# Patient Record
Sex: FEMALE | Race: WHITE | HISPANIC OR LATINO | Employment: UNEMPLOYED | URBAN - METROPOLITAN AREA
[De-identification: names, ages, dates, MRNs, and addresses within clinical notes are randomized per-mention and may not be internally consistent; named-entity substitution may affect disease eponyms.]

---

## 2021-09-24 PROCEDURE — C9803 HOPD COVID-19 SPEC COLLECT: HCPCS

## 2021-09-24 PROCEDURE — 99285 EMERGENCY DEPT VISIT HI MDM: CPT | Mod: 25

## 2021-09-24 PROCEDURE — 96376 TX/PRO/DX INJ SAME DRUG ADON: CPT

## 2021-09-24 PROCEDURE — 96374 THER/PROPH/DIAG INJ IV PUSH: CPT

## 2021-09-24 PROCEDURE — 96375 TX/PRO/DX INJ NEW DRUG ADDON: CPT

## 2021-09-24 PROCEDURE — 96361 HYDRATE IV INFUSION ADD-ON: CPT

## 2021-09-25 ENCOUNTER — APPOINTMENT (OUTPATIENT)
Dept: ULTRASOUND IMAGING | Facility: CLINIC | Age: 26
End: 2021-09-25
Attending: EMERGENCY MEDICINE

## 2021-09-25 ENCOUNTER — HOSPITAL ENCOUNTER (OUTPATIENT)
Facility: CLINIC | Age: 26
Discharge: HOME | End: 2021-09-26
Attending: EMERGENCY MEDICINE | Admitting: INTERNAL MEDICINE

## 2021-09-25 DIAGNOSIS — K85.90 ACUTE PANCREATITIS, UNSPECIFIED COMPLICATION STATUS, UNSPECIFIED PANCREATITIS TYPE: ICD-10-CM

## 2021-09-25 LAB
ALBUMIN SERPL-MCNC: 3.8 G/DL (ref 3.4–5)
ALP SERPL-CCNC: 84 U/L (ref 40–150)
ALT SERPL W P-5'-P-CCNC: 70 U/L (ref 0–50)
ANION GAP SERPL CALCULATED.3IONS-SCNC: 6 MMOL/L (ref 3–14)
AST SERPL W P-5'-P-CCNC: 28 U/L (ref 0–45)
B-HCG SERPL-ACNC: <1 IU/L (ref 0–5)
BASOPHILS # BLD AUTO: 0 10E3/UL (ref 0–0.2)
BASOPHILS NFR BLD AUTO: 0 %
BILIRUB SERPL-MCNC: 0.6 MG/DL (ref 0.2–1.3)
BUN SERPL-MCNC: 8 MG/DL (ref 7–30)
CALCIUM SERPL-MCNC: 10.4 MG/DL (ref 8.5–10.1)
CHLORIDE BLD-SCNC: 101 MMOL/L (ref 94–109)
CHOLEST SERPL-MCNC: 169 MG/DL
CO2 SERPL-SCNC: 29 MMOL/L (ref 20–32)
CREAT SERPL-MCNC: 0.63 MG/DL (ref 0.52–1.04)
EOSINOPHIL # BLD AUTO: 0.2 10E3/UL (ref 0–0.7)
EOSINOPHIL NFR BLD AUTO: 2 %
ERYTHROCYTE [DISTWIDTH] IN BLOOD BY AUTOMATED COUNT: 12.3 % (ref 10–15)
ETHANOL SERPL-MCNC: <0.01 G/DL
GFR SERPL CREATININE-BSD FRML MDRD: >90 ML/MIN/1.73M2
GLUCOSE BLD-MCNC: 107 MG/DL (ref 70–99)
HCT VFR BLD AUTO: 41.9 % (ref 35–47)
HDLC SERPL-MCNC: 40 MG/DL
HGB BLD-MCNC: 13.8 G/DL (ref 11.7–15.7)
HOLD SPECIMEN: NORMAL
IMM GRANULOCYTES # BLD: 0 10E3/UL
IMM GRANULOCYTES NFR BLD: 0 %
LDLC SERPL CALC-MCNC: 111 MG/DL
LIPASE SERPL-CCNC: 1552 U/L (ref 73–393)
LYMPHOCYTES # BLD AUTO: 2.7 10E3/UL (ref 0.8–5.3)
LYMPHOCYTES NFR BLD AUTO: 28 %
MAGNESIUM SERPL-MCNC: 1.9 MG/DL (ref 1.6–2.3)
MCH RBC QN AUTO: 31.5 PG (ref 26.5–33)
MCHC RBC AUTO-ENTMCNC: 32.9 G/DL (ref 31.5–36.5)
MCV RBC AUTO: 96 FL (ref 78–100)
MONOCYTES # BLD AUTO: 0.6 10E3/UL (ref 0–1.3)
MONOCYTES NFR BLD AUTO: 6 %
NEUTROPHILS # BLD AUTO: 6.2 10E3/UL (ref 1.6–8.3)
NEUTROPHILS NFR BLD AUTO: 64 %
NONHDLC SERPL-MCNC: 129 MG/DL
NRBC # BLD AUTO: 0 10E3/UL
NRBC BLD AUTO-RTO: 0 /100
PLATELET # BLD AUTO: 340 10E3/UL (ref 150–450)
POTASSIUM BLD-SCNC: 3.8 MMOL/L (ref 3.4–5.3)
POTASSIUM BLD-SCNC: 3.8 MMOL/L (ref 3.4–5.3)
PROT SERPL-MCNC: 8.2 G/DL (ref 6.8–8.8)
RBC # BLD AUTO: 4.38 10E6/UL (ref 3.8–5.2)
SARS-COV-2 RNA RESP QL NAA+PROBE: NEGATIVE
SODIUM SERPL-SCNC: 136 MMOL/L (ref 133–144)
TRIGL SERPL-MCNC: 89 MG/DL
WBC # BLD AUTO: 9.7 10E3/UL (ref 4–11)

## 2021-09-25 PROCEDURE — 258N000003 HC RX IP 258 OP 636: Performed by: EMERGENCY MEDICINE

## 2021-09-25 PROCEDURE — 96375 TX/PRO/DX INJ NEW DRUG ADDON: CPT

## 2021-09-25 PROCEDURE — 82784 ASSAY IGA/IGD/IGG/IGM EACH: CPT | Performed by: INTERNAL MEDICINE

## 2021-09-25 PROCEDURE — 76705 ECHO EXAM OF ABDOMEN: CPT

## 2021-09-25 PROCEDURE — 84132 ASSAY OF SERUM POTASSIUM: CPT | Performed by: INTERNAL MEDICINE

## 2021-09-25 PROCEDURE — 87635 SARS-COV-2 COVID-19 AMP PRB: CPT | Performed by: EMERGENCY MEDICINE

## 2021-09-25 PROCEDURE — 250N000011 HC RX IP 250 OP 636: Performed by: INTERNAL MEDICINE

## 2021-09-25 PROCEDURE — 83690 ASSAY OF LIPASE: CPT | Performed by: EMERGENCY MEDICINE

## 2021-09-25 PROCEDURE — 82077 ASSAY SPEC XCP UR&BREATH IA: CPT | Performed by: EMERGENCY MEDICINE

## 2021-09-25 PROCEDURE — 36415 COLL VENOUS BLD VENIPUNCTURE: CPT | Performed by: INTERNAL MEDICINE

## 2021-09-25 PROCEDURE — 80061 LIPID PANEL: CPT | Performed by: INTERNAL MEDICINE

## 2021-09-25 PROCEDURE — C9113 INJ PANTOPRAZOLE SODIUM, VIA: HCPCS | Performed by: INTERNAL MEDICINE

## 2021-09-25 PROCEDURE — 96374 THER/PROPH/DIAG INJ IV PUSH: CPT

## 2021-09-25 PROCEDURE — 83735 ASSAY OF MAGNESIUM: CPT | Performed by: INTERNAL MEDICINE

## 2021-09-25 PROCEDURE — 80053 COMPREHEN METABOLIC PANEL: CPT | Performed by: EMERGENCY MEDICINE

## 2021-09-25 PROCEDURE — 84702 CHORIONIC GONADOTROPIN TEST: CPT | Performed by: EMERGENCY MEDICINE

## 2021-09-25 PROCEDURE — 96376 TX/PRO/DX INJ SAME DRUG ADON: CPT

## 2021-09-25 PROCEDURE — 36415 COLL VENOUS BLD VENIPUNCTURE: CPT | Performed by: EMERGENCY MEDICINE

## 2021-09-25 PROCEDURE — 258N000003 HC RX IP 258 OP 636: Performed by: INTERNAL MEDICINE

## 2021-09-25 PROCEDURE — 85025 COMPLETE CBC W/AUTO DIFF WBC: CPT | Performed by: EMERGENCY MEDICINE

## 2021-09-25 PROCEDURE — 99207 PR APP CREDIT; MD BILLING SHARED VISIT: CPT | Performed by: INTERNAL MEDICINE

## 2021-09-25 PROCEDURE — 120N000006 HC R&B PEDS

## 2021-09-25 PROCEDURE — 250N000011 HC RX IP 250 OP 636: Performed by: EMERGENCY MEDICINE

## 2021-09-25 PROCEDURE — 99223 1ST HOSP IP/OBS HIGH 75: CPT | Mod: AI | Performed by: INTERNAL MEDICINE

## 2021-09-25 RX ORDER — ONDANSETRON 2 MG/ML
4 INJECTION INTRAMUSCULAR; INTRAVENOUS ONCE
Status: COMPLETED | OUTPATIENT
Start: 2021-09-25 | End: 2021-09-25

## 2021-09-25 RX ORDER — NALOXONE HYDROCHLORIDE 0.4 MG/ML
0.2 INJECTION, SOLUTION INTRAMUSCULAR; INTRAVENOUS; SUBCUTANEOUS
Status: DISCONTINUED | OUTPATIENT
Start: 2021-09-25 | End: 2021-09-26 | Stop reason: HOSPADM

## 2021-09-25 RX ORDER — SODIUM CHLORIDE, SODIUM LACTATE, POTASSIUM CHLORIDE, CALCIUM CHLORIDE 600; 310; 30; 20 MG/100ML; MG/100ML; MG/100ML; MG/100ML
INJECTION, SOLUTION INTRAVENOUS CONTINUOUS
Status: DISCONTINUED | OUTPATIENT
Start: 2021-09-25 | End: 2021-09-26 | Stop reason: HOSPADM

## 2021-09-25 RX ORDER — HYDROMORPHONE HYDROCHLORIDE 1 MG/ML
0.5 INJECTION, SOLUTION INTRAMUSCULAR; INTRAVENOUS; SUBCUTANEOUS ONCE
Status: COMPLETED | OUTPATIENT
Start: 2021-09-25 | End: 2021-09-25

## 2021-09-25 RX ORDER — ACETAMINOPHEN 325 MG/1
650 TABLET ORAL EVERY 4 HOURS PRN
Status: DISCONTINUED | OUTPATIENT
Start: 2021-09-25 | End: 2021-09-26 | Stop reason: HOSPADM

## 2021-09-25 RX ORDER — ONDANSETRON 4 MG/1
4 TABLET, ORALLY DISINTEGRATING ORAL EVERY 6 HOURS PRN
Status: DISCONTINUED | OUTPATIENT
Start: 2021-09-25 | End: 2021-09-26 | Stop reason: HOSPADM

## 2021-09-25 RX ORDER — NALOXONE HYDROCHLORIDE 0.4 MG/ML
0.4 INJECTION, SOLUTION INTRAMUSCULAR; INTRAVENOUS; SUBCUTANEOUS
Status: DISCONTINUED | OUTPATIENT
Start: 2021-09-25 | End: 2021-09-26 | Stop reason: HOSPADM

## 2021-09-25 RX ORDER — LIDOCAINE 40 MG/G
CREAM TOPICAL
Status: DISCONTINUED | OUTPATIENT
Start: 2021-09-25 | End: 2021-09-26 | Stop reason: HOSPADM

## 2021-09-25 RX ORDER — ONDANSETRON 2 MG/ML
4 INJECTION INTRAMUSCULAR; INTRAVENOUS EVERY 6 HOURS PRN
Status: DISCONTINUED | OUTPATIENT
Start: 2021-09-25 | End: 2021-09-26 | Stop reason: HOSPADM

## 2021-09-25 RX ORDER — HYDROMORPHONE HCL IN WATER/PF 6 MG/30 ML
0.2 PATIENT CONTROLLED ANALGESIA SYRINGE INTRAVENOUS
Status: DISCONTINUED | OUTPATIENT
Start: 2021-09-25 | End: 2021-09-25

## 2021-09-25 RX ORDER — KETOROLAC TROMETHAMINE 15 MG/ML
15 INJECTION, SOLUTION INTRAMUSCULAR; INTRAVENOUS ONCE
Status: COMPLETED | OUTPATIENT
Start: 2021-09-25 | End: 2021-09-25

## 2021-09-25 RX ORDER — SODIUM CHLORIDE 9 MG/ML
INJECTION, SOLUTION INTRAVENOUS CONTINUOUS
Status: DISCONTINUED | OUTPATIENT
Start: 2021-09-25 | End: 2021-09-25

## 2021-09-25 RX ORDER — HYDROMORPHONE HCL IN WATER/PF 6 MG/30 ML
.2-.4 PATIENT CONTROLLED ANALGESIA SYRINGE INTRAVENOUS
Status: DISCONTINUED | OUTPATIENT
Start: 2021-09-25 | End: 2021-09-26 | Stop reason: HOSPADM

## 2021-09-25 RX ORDER — OXYCODONE HYDROCHLORIDE 5 MG/1
5 TABLET ORAL EVERY 4 HOURS PRN
Status: DISCONTINUED | OUTPATIENT
Start: 2021-09-25 | End: 2021-09-26 | Stop reason: HOSPADM

## 2021-09-25 RX ADMIN — ONDANSETRON 4 MG: 2 INJECTION INTRAMUSCULAR; INTRAVENOUS at 01:37

## 2021-09-25 RX ADMIN — HYDROMORPHONE HYDROCHLORIDE 0.5 MG: 1 INJECTION, SOLUTION INTRAMUSCULAR; INTRAVENOUS; SUBCUTANEOUS at 01:37

## 2021-09-25 RX ADMIN — ONDANSETRON 4 MG: 2 INJECTION INTRAMUSCULAR; INTRAVENOUS at 03:38

## 2021-09-25 RX ADMIN — SODIUM CHLORIDE, POTASSIUM CHLORIDE, SODIUM LACTATE AND CALCIUM CHLORIDE: 600; 310; 30; 20 INJECTION, SOLUTION INTRAVENOUS at 16:47

## 2021-09-25 RX ADMIN — PANTOPRAZOLE SODIUM 40 MG: 40 INJECTION, POWDER, FOR SOLUTION INTRAVENOUS at 08:31

## 2021-09-25 RX ADMIN — SODIUM CHLORIDE, PRESERVATIVE FREE: 5 INJECTION INTRAVENOUS at 06:09

## 2021-09-25 RX ADMIN — SODIUM CHLORIDE, POTASSIUM CHLORIDE, SODIUM LACTATE AND CALCIUM CHLORIDE: 600; 310; 30; 20 INJECTION, SOLUTION INTRAVENOUS at 08:30

## 2021-09-25 RX ADMIN — HYDROMORPHONE HYDROCHLORIDE 0.2 MG: 0.2 INJECTION, SOLUTION INTRAMUSCULAR; INTRAVENOUS; SUBCUTANEOUS at 08:25

## 2021-09-25 RX ADMIN — SODIUM CHLORIDE 1000 ML: 9 INJECTION, SOLUTION INTRAVENOUS at 01:36

## 2021-09-25 RX ADMIN — KETOROLAC TROMETHAMINE 15 MG: 15 INJECTION, SOLUTION INTRAMUSCULAR; INTRAVENOUS at 01:37

## 2021-09-25 RX ADMIN — HYDROMORPHONE HYDROCHLORIDE 0.5 MG: 1 INJECTION, SOLUTION INTRAMUSCULAR; INTRAVENOUS; SUBCUTANEOUS at 03:39

## 2021-09-25 ASSESSMENT — ENCOUNTER SYMPTOMS
RHINORRHEA: 0
NAUSEA: 1
CHILLS: 1
ABDOMINAL PAIN: 1
VOMITING: 1
COUGH: 0
FEVER: 0

## 2021-09-25 ASSESSMENT — MIFFLIN-ST. JEOR: SCORE: 1644.99

## 2021-09-25 NOTE — CONSULTS
[facility] Consultation Note     Patient Name: Chelsie Pierce      YOB: 1995 (Age: 26 year old)   Medical Record #: [id number]   Primary Physician: No Ref-Primary, Physician   Admit Date/Time: 9/25/2021 12:45 AM     I was asked to see this patient by Dr. Vasques for evaluation of pancreatitis.     Impression & Plan     26 year old female with episode of pancreatitis a year ago presenting with acute pancreatitis.    1. Pancreatitis.  Her initial episode of pancreatitis was thought to be secondary to alcohol.  However she has not had any alcohol in 9 months.  Her liver tests do not support gallstones as a cause of pancreatitis.  She is on no medications.  Her calcium is slightly high, triglycerides normal, although this should be interpreted with caution in the setting of acute pancreatitis.  No family history of pancreatitis.    Calcium, triglycerides after pancreatitis resolved    IgG4 to look for autoimmune pancreatitis as cause    Recommend low fat diet, avoidance of alcohol at discharge    Given she feels better, okay to advance diet slowly    If above evaluation is negative, she should have an endoscopic ultrasound in 6 weeks to evaluate for cause  2. Elevated liver tests.  The pattern of elevated liver tests and steatosis on ultrasound, in the setting of elevated BMI are consistent with nonalcoholic fatty liver disease.    Plan was communicated with primary care team. Thank you for allowing us to participate in the care of Chelsie Pierce. Please call with any questions or pertinent change in clinical status.     Tiana Veloz MD MS....................  9/25/2021     Cell [phone]  After 5 PM call [phone]    [facility]          History of Presenting Illness:    Chelsie Pierce is a 26 year old female with history of pancreatitis admitted 9/25/2021 for recurrent pancreatitis.  History is obtained with the use of an  ".  She presents with 1 day of abdominal pain, nausea.  Pain has resolved.  Pain was severe, epigastric, and worse after eating fatty foods.  She had a similar episode a year ago in [facility].  She relates that she was treated with antibiotics for pancreatitis.  Those records are not available for review.  She reports at that time she was drinking a large amount of alcohol.  She has not had any alcohol in 9 months.  She has no family history of pancreas issues.  She does not smoke.  She uses no medications including over-the-counter's.    Liver tests show mild elevation in ALT. Ultrasound with hepatic steatosis.     Review of Systems:   Review of Systems: Negative    Past medical history  Episode of pancreatitis  No prior surgeries    Medications & Allergies     No medications    Current scheduled medications:     pantoprazole (PROTONIX) IV  40 mg Intravenous Daily with breakfast     sodium chloride (PF)  3 mL Intracatheter Q8H     sodium chloride (PF)  3 mL Intracatheter Q8H     Current PRN medications:     acetaminophen, HYDROmorphone, lidocaine 4%, lidocaine 4%, lidocaine (buffered or not buffered), lidocaine (buffered or not buffered), melatonin, naloxone **OR** naloxone **OR** naloxone **OR** naloxone, ondansetron **OR** ondansetron, oxyCODONE, sodium chloride (PF), sodium chloride (PF)    No Known Allergies    Social & Family History   Social History:    From [facility].  No alcohol for 9 months.  Does not smoke.    Family History: No family history of pancreatitis.    Physical Exam   Physical Exam:   Vital signs:   Blood pressure 111/61, pulse 60, temperature 97.7  F (36.5  C), temperature source Oral, resp. rate 16, height 1.58 m (5' 2.21\"), weight 93.8 kg (206 lb 14.4 oz), last menstrual period 09/21/2021, SpO2 97 %.  Estimated body mass index is 37.59 kg/m  as calculated from the following:    Height as of this encounter: 1.58 m (5' 2.21\").    Weight as of this encounter: 93.8 kg (206 lb 14.4 " oz).  General Appearance:   Alert, oriented, no acute distress  Eyes: No scleral icterus  HEENT: Normocephalic, atraumatic  Respiratory: Bilateral breath sounds  CV: Regular  GI: Soft, obese  Musculoskeletal: Normal for age and gender  Lymphatic: No edema  Skin/hair: No jaundice  Neurologic: Nonfocal    Labs & Micro   Labs:   Recent Labs   Lab Test 09/25/21  0031   WBC 9.7   HGB 13.8   MCV 96        Recent Labs   Lab Test 09/25/21  0658 09/25/21  0031   POTASSIUM 3.8 3.8   CHLORIDE  --  101   BUN  --  8     Recent Labs   Lab Test 09/25/21  0031   ALBUMIN 3.8   BILITOTAL 0.6   ALT 70*   AST 28   LIPASE 1,552*

## 2021-09-25 NOTE — PHARMACY-ADMISSION MEDICATION HISTORY
Admission medication history interview status for this patient is complete. See [facility] admission navigator for allergy information, prior to admission medications and immunization status.     Medication history interview done, indicate source(s): Patient with   Medication history resources (including written lists, pill bottles, clinic record):None  Pharmacy: Discharge Pharmacy    Patient confirmed currently taking no prescription or OTC medications.    Prior to Admission medications    Not on File

## 2021-09-25 NOTE — PROGRESS NOTES
Patient admitted to room 241.  used via ipad. Initial nursing assessment and nursing admission questions completed. Patient oriented to room and unit procedures. VSS. Patient stating pain is a 3/10 and no additional interventions are needed at this time. Patient encouraged to call RN if pain gets worse. Patient stated an understanding. Patient also stating intermittent nausea - patient encouraged to call RN when nauseous. PIV site c/d/i and flushed. IVF started per MAR. Patient is resting comfortably. Will continue to monitor and will notify service with any questions or concerns.

## 2021-09-25 NOTE — PLAN OF CARE
Dilaudid x1 for pain with good relief. Denies nausea. Took small amount of clear liquids, but not interested in more as of yet. Continue to encourage intake as tolerated and medicate for pain as needed.

## 2021-09-25 NOTE — PLAN OF CARE
Orientation:A&ox3.  via ipad used for communication.  Vss afebrile  LS:clear  GI:bs+, c/o discomfort, no pain. Tolerating clear liq diet  : voiding with out problems  Skin: intact  Activity:up indep in room  Pain: c/o discomfort 1/10, not pain.  Plan: clear liq diet. Ivf. Pain or nausea meds if needed. Repeat labs in am.

## 2021-09-25 NOTE — ED NOTES
[facility]  ED Nurse Handoff Report    Chelsie Pierce is a 26 year old female   ED Chief complaint: Abdominal Pain  . ED Diagnosis:   Final diagnoses:   Acute pancreatitis, unspecified complication status, unspecified pancreatitis type     Allergies: No Known Allergies    Code Status: Full Code  Activity level - Baseline/Home:  Independent. Activity Level - Current:   Stand by Assist. Lift room needed: No. Bariatric: No   Needed: Yes!!! Polish speaking only  Isolation: No. Infection: Not Applicable.     Vital Signs:   Vitals:    09/25/21 0400 09/25/21 0415 09/25/21 0430 09/25/21 0445   BP: 105/69      Pulse: 64      Resp:       Temp:       TempSrc:       SpO2: 95% 98% 96% 95%       Cardiac Rhythm:  ,      Pain level:    Patient confused: No. Patient Falls Risk: No.   Elimination Status: Has voided   Patient Report - Initial Complaint:Here for concern of epigastric abdominal pain started this morning associated with n/v. ABCs intact.    . Focused Assessment: Chelsie Pierce is a 26 year old female with history of gastritis who presents with epigastric pain that started this morning when she woke up. She also notes chills, mild chest pressure, nausea, and vomiting. Has not taken any medication for pain. Denies fever, cough, or runny nose. No alcohol use. Denies a history of gallbladder issues. She is not Covid vaccinated.      Review of Systems   Constitutional: Positive for chills. Negative for fever.   HENT: Negative for rhinorrhea.    Respiratory: Negative for cough.    Cardiovascular: Positive for chest pain.   Gastrointestinal: Positive for abdominal pain, nausea and vomiting  Tests Performed:   US Abdomen Limited (RUQ)   Final Result   IMPRESSION:   1.  Hepatic steatosis.              . Abnormal Results:   Labs Ordered and Resulted from Time of ED Arrival Up to the Time of Departure from the ED   COMPREHENSIVE METABOLIC PANEL - Abnormal; Notable for the following  components:       Result Value    Calcium 10.4 (*)     Glucose 107 (*)     ALT 70 (*)     All other components within normal limits   LIPASE - Abnormal; Notable for the following components:    Lipase 1,552 (*)     All other components within normal limits   HCG QUANTITATIVE PREGNANCY - Normal   COVID-19 VIRUS (CORONAVIRUS) BY PCR - Normal    Narrative:     Testing was performed using the vilma  SARS-CoV-2 & Influenza A/B Assay on the vilma  Brunilda  System.  This test should be ordered for the detection of SARS-COV-2 in individuals who meet SARS-CoV-2 clinical and/or epidemiological criteria. Test performance is unknown in asymptomatic patients.  This test is for in vitro diagnostic use under the FDA EUA for laboratories certified under CLIA to perform moderate and/or high complexity testing. This test has not been FDA cleared or approved.  A negative test does not rule out the presence of PCR inhibitors in the specimen or target RNA in concentration below the limit of detection for the assay. The possibility of a false negative should be considered if the patient's recent exposure or clinical presentation suggests COVID-19.  [facility] are certified under the Clinical Laboratory Improvement Amendments of 1988 (CLIA-88) as qualified to perform moderate and/or high complexity laboratory testing.   ETHYL ALCOHOL LEVEL - Normal   CBC WITH PLATELETS AND DIFFERENTIAL   EXTRA RED TOP TUBE   EXTRA GREEN TOP (LITHIUM HEPARIN) TUBE   EXTRA PURPLE TOP TUBE   CBC WITH PLATELETS & DIFFERENTIAL    Narrative:     The following orders were created for panel order CBC with platelets differential.  Procedure                               Abnormality         Status                     ---------                               -----------         ------                     CBC with platelets and d...[[id number]]                      Final result                 Please view results for these tests on the individual  orders.   EXTRA TUBE    Narrative:     The following orders were created for panel order Extra Tube ([place] Draw).  Procedure                               Abnormality         Status                     ---------                               -----------         ------                     Extra Red Top Tube[[id number]]                               Final result               Extra Green Top (Lithium...[[id number]]                      Final result               Extra Purple Top Tube[[id number]]                            Final result                 Please view results for these tests on the individual orders.     .   Treatments provided: pain control  Family Comments:  aware and was here  OBS brochure/video discussed/provided to patient:  N/A  ED Medications:   Medications   0.9% sodium chloride BOLUS (0 mLs Intravenous Stopped 9/25/21 0426)   ondansetron (ZOFRAN) injection 4 mg (4 mg Intravenous Given 9/25/21 0137)   ketorolac (TORADOL) injection 15 mg (15 mg Intravenous Given 9/25/21 0137)   HYDROmorphone (PF) (DILAUDID) injection 0.5 mg (0.5 mg Intravenous Given 9/25/21 0137)   HYDROmorphone (PF) (DILAUDID) injection 0.5 mg (0.5 mg Intravenous Given 9/25/21 0339)   ondansetron (ZOFRAN) injection 4 mg (4 mg Intravenous Given 9/25/21 0338)     Drips infusing:  No  For the majority of the shift, the patient's behavior Green. Interventions performed were support and reassurance.    Sepsis treatment initiated: No     Patient tested for COVID 19 prior to admission: YES. negative    ED Nurse Name/Phone Number: Diana Liang RN,   4:59 AM     RECEIVING UNIT ED HANDOFF REVIEW    Above ED Nurse Handoff Report was reviewed: Yes  Reviewed by: Amna Nobles RN on September 25, 2021 at 5:25 AM

## 2021-09-25 NOTE — H&P
"[facility]    Hospitalist History and Physical    Name: Chelsie Pierce    MRN: [id number]  YOB: 1995    Age: 26 year old  Date of Admission:  9/25/2021  Date of Service (when I saw the patient): 09/25/21    Assessment & Plan   Chelsie Pierce is a 26 year old female with past medical history significant for GERD, history of pancreatitis a year ago presents to the emergency room with sudden onset abdominal pain for 1 day.  Evaluation in the emergency room shows elevated lipase admitted for acute pancreatitis    Acute pancreatitis  -Denies any recent use of alcohol last drink was about 2 months ago  -Not on any medications  -No history of gallstones   -Gives history of a similar episode 1 year ago in [facility] was told she had \" bacteria in her pancreas\" ?  Pancreatitis  -Right upper quadrant ultrasound in the emergency room negative for gallstone  -We will check triglycerides level  -N.p.o. for now  -IV fluids  -As needed pain meds  -Antiemetics  -Admit to medicine  -We will slowly start clears once pain is better controlled    Gives history of GERD  -We will start on IV PPI      DVT Prophylaxis: Pneumatic Compression Devices  Code Status: Full Code    Disposition: Expected discharge in 1 to 2 days if able to tolerate p.o.    Primary Care Physician   Physician No Ref-Primary    Chief Complaint   Abdominal pain    History is obtained from the patient with the help of a  through iPad    History of Present Illness   Chelsie Pierce is a 26 year old female with past medical history significant for GERD, diagnosis of possible pancreat[facility] a [facility], with history of intermittent gastritis and pain in epigastric area for last 10 years.  Woke up this morning with epigastric pain about 4 out of 10.  Throughout the day pain got worse to the point it was 10 out of 10 and patient came to the emergency room.  Pain is described as " sharp as if someone is hitting her in the epigastric area does not radiate.  Did not get worse with food had one episode of vomiting no change in pain with vomiting.  Has not had a bowel movement since then no fever no chills.  No other positional changes that makes the pain better or worse.  Denies any use of alcohol.  She occasionally uses alcohol last drink was about 2 months ago.  Denies smoking denies any new meds is a not on any medications.  She does give history of similar incident episode a year ago where she was in [facility] and was diagnosed with bacteria in pancreas.    More than 10 point review of systems was carried out was otherwise negative.  Evaluation in the emergency room showed elevated lipase and right upper quadrant ultrasound showed no gallstones.  She was received IV fluids pain medications and is being admitted for further treatment    Past Medical History    No past medical history on file.   Possible pancreatitis      Past Surgical History   No past surgical history on file.    Prior to Admission Medications   None     Allergies   No Known Allergies    Social History   Social History     Tobacco Use     Smoking status: Not on file   Substance Use Topics     Alcohol use: Not on file     Social History     Social History Narrative     Not on file     Denies smoking no use of alcohol lives with  and a daughter.  She is a has    Family History   The history is significant for diabetes mellitus in her mom and maternal side  Review of Systems   A Comprehensive greater than 10 system review of systems was carried out.  Pertinent positives and negatives are noted above.  Otherwise negative for contributory information.    Physical Exam   Temp: 97.6  F (36.4  C) Temp src: Oral BP: (!) 125/90 Pulse: 79   Resp: 18 SpO2: 99 % O2 Device: None (Room air)    Vital Signs with Ranges  Temp:  [97.6  F (36.4  C)] 97.6  F (36.4  C)  Pulse:  [79] 79  Resp:  [18] 18  BP: (125-129)/(63-90) 125/90  SpO2:   [98 %-99 %] 99 %  0 lbs 0 oz    GEN:  Alert, oriented x 3, appears comfortable, no overt distress  HEENT:  Normocephalic/atraumatic, no scleral icterus, no nasal discharge, mouth moist.  CV:  Regular rate and rhythm, no murmur or JVD.  S1 + S2 noted, no S3 or S4.  LUNGS:  Clear to auscultation bilaterally without rales/rhonchi/wheezing/retractions.  Symmetric chest rise on inhalation noted.  ABD:  Active bowel sounds, soft, gastric tenderness.  No rebound/guarding/rigidity.  EXT:  No edema.  No cyanosis.  No joint synovitis noted.  SKIN:  Dry to touch, no exanthems noted in the visualized areas.  NEURO:  Symmetric muscle strength, sensation to touch grossly intact.  Coordination symmetric on general exam.  No new focal deficits appreciated.    Data   Data reviewed today:  I personally reviewed the Right upper quadrant image(s) showing Fatty liver.    Recent Labs   Lab 09/25/21 0031   WBC 9.7   HGB 13.8   HCT 41.9   MCV 96        Recent Labs   Lab 09/25/21 0031      POTASSIUM 3.8   CHLORIDE 101   CO2 29   ANIONGAP 6   *   BUN 8   CR 0.63   GFRESTIMATED >90   GABRIEL 10.4*     No results for input(s): CULT in the last 168 hours.  No results for input(s): NTBNPI, NTBNP in the last 168 hours.  Recent Labs   Lab 09/25/21 0031   HGB 13.8     Recent Labs   Lab 09/25/21 0031   AST 28   ALT 70*   ALKPHOS 84   BILITOTAL 0.6     No results for input(s): INR in the last 168 hours.  No results for input(s): LACT in the last 168 hours.  Recent Labs   Lab 09/25/21 0031   LIPASE 1,552*     Recent Labs   Lab 09/25/21 0031   BUN 8   CR 0.63     No results for input(s): TSH in the last 168 hours.  No results for input(s): TROPONIN, TROPI, TROPR in the last 168 hours.    Invalid input(s): TROP, TROPONINIES  No results for input(s): COLOR, APPEARANCE, URINEGLC, URINEBILI, URINEKETONE, SG, UBLD, URINEPH, PROTEIN, UROBILINOGEN, NITRITE, LEUKEST, RBCU, WBCU in the last 168 hours.    Recent Results (from the past 24  hour(s))   US Abdomen Limited (RUQ)    Narrative    EXAM: US ABDOMEN LIMITED  LOCATION: [facility]  DATE/TIME: 9/25/2021 2:24 AM    INDICATION: RUQ pain  COMPARISON: None.  TECHNIQUE: Limited abdominal ultrasound.    FINDINGS:    GALLBLADDER: Normal. No gallstones, wall thickening, or pericholecystic fluid. Negative sonographic Cohen's sign.    BILE DUCTS: No biliary dilatation. The common duct measures 4 mm.    LIVER: Increased echogenicity from diffuse fatty infiltration. No focal mass.    RIGHT KIDNEY: No hydronephrosis.    PANCREAS: The visualized portions are normal.    No ascites.      Impression    IMPRESSION:  1.  Hepatic steatosis.

## 2021-09-25 NOTE — ED PROVIDER NOTES
History   Chief Complaint:  Abdominal Pain       A  was used (Hungarian).      Chelsie Pierce is a 26 year old female with history of gastritis who presents with epigastric pain that started this morning when she woke up. She also notes chills, mild chest pressure, nausea, and vomiting. She has not taken any medication for pain. Denies fever, cough, or runny nose. No alcohol use. Denies a history of gallbladder issues. She is not Covid vaccinated. No ETOH/drug use.    Review of Systems   Constitutional: Positive for chills. Negative for fever.   HENT: Negative for rhinorrhea.    Respiratory: Negative for cough.    Cardiovascular: Positive for chest pain.   Gastrointestinal: Positive for abdominal pain, nausea and vomiting.   All other systems reviewed and are negative.    Allergies:  The patient has no known allergies.     Medications:  The patient is not currently taking any prescribed medications.    Past Medical History:    The patient denies past medical history including gallbladder issues.     Social History:  Patient presents to the ED with family.    Physical Exam     Patient Vitals for the past 24 hrs:   BP Temp Temp src Pulse Resp SpO2   09/25/21 0130 (!) 125/90 -- -- -- -- 99 %   09/24/21 2341 129/63 97.6  F (36.4  C) Oral 79 18 98 %       Physical Exam  Nursing note and vitals reviewed.  Constitutional: Well nourished. Appears uncomfortable  Eyes: Conjunctiva normal.  Pupils are equal, round, and reactive to light.   ENT: Nose normal. Mucous membranes pink and moist.    Neck: Normal range of motion.  CVS: Normal rate, regular rhythm.  Normal heart sounds.  No murmur.  Pulmonary: Lungs clear to auscultation bilaterally. No wheezes/rales/rhonchi.  GI: Abdomen soft. RUQ tenderness. No rigidity or guarding.  No CVA tenderness  MSK: No calf tenderness or swelling.  Neuro: Alert. Follows simple commands.  Skin: Skin is warm and dry. No rash noted.   Psychiatric: Normal affect.        Emergency Department Course     Imaging:  US Abdomen Limited (RUQ):  Hepatic steatosis. As per radiology.     Laboratory:   CBC: WBC: 9.7, HGB: 13.8, PLT: 340    CMP: Glucose 107 (H), Calcium: 10.4 (H), ALT: 70(H), o/w WNL (Creatinine: 0.63)    Lipase: 1,552 (H)    HCG Quantitative Blood: <1    Asymptomatic COVID-19 PCR: Negative    Alcohol ethyl<0.01    Emergency Department Course:    Reviewed:  I reviewed nursing notes and vitals    Assessments:  0126 I obtained history and examined the patient as noted above.     0310 I rechecked the patient and explained findings.     Consults:   0259 I spoke with Dr. Vasques, hospitalist, who accepts the patient.     Interventions:  0136 NS 1L IV  0137 Zofran 4 mg IV  0137 Toradol 15 mg IV  0137 Dilaudid 0.5 mg IV    Disposition:  The patient was admitted to the hospital under the care of Dr. Vasques.     Impression & Plan     Medical Decision Making:  Chelsie Pierce is a 26 year old female who presents with epigastric abdominal pain.  The differential diagnosis would include GERD, GIB, esophageal spasm, atypical cardiac sx's, pancreatitis, biliary colic or gallstone disease, AAA, gastroenteritis, gastritis, large vs small bowel disease, etc.  Based on history, PE and labs, the most likely explanation is pancreatitis.  Ultrasound of gallbladder shows hepatic steatosis, no evidence to suggest gallstone pancreatitis.   Pain control in ED was not successful.  I do not feel advanced CT imaging is warranted however at this time.  Patient denies ETOH, possible pancreatitis induced by triglyceridemia.  Patient accepted by hospitalist for admission.      Diagnosis:    ICD-10-CM    1. Acute pancreatitis, unspecified complication status, unspecified pancreatitis type  K85.90        Scribe Disclosure:  Krishna STEVENSON, am serving as a scribe at 1:16 AM on 9/25/2021 to document services personally performed by Fern Minor DO based on my observations and the  provider's statements to me.              Fern Minor, DO  09/25/21 0544

## 2021-09-25 NOTE — ED TRIAGE NOTES
Here for concern of epigastric abdominal pain started this morning associated with n/v. ABCs intact.

## 2021-09-25 NOTE — PROGRESS NOTES
[facility]  Hospitalist Progress Note  Lazaro Simpson MD 09/25/21    Reason for Stay (Diagnosis): acute pancreatitis         Assessment and Plan:      Summary of Stay: Chelsie Pierce is a 26 year old female with history of obesity, GERD, and pancreatitis 1 year ago in [facility] at the time thought to be from alcohol who is now admitted on 9/25/2021 after presenting with 1 day of acute epigastric pain and found to have elevated lipase at 1,552 consistent with acute pancreatitis.  Abdominal ultrasound did not show any cholelithiasis, but did show hepatic steatosis.  Slight elevation in ALT otherwise LFTs normal.  CBC unremarkable.  Admitted for IV fluids, n.p.o. status initially, and pain control.  TGs normal, no alcohol use in many months, no gallstones.  Etiology for pancreatitis unclear, [facility] consulted, IgG4.  Added still very tender on exam and not really tolerating clear liquid diet today so not ready for discharge.    Problem List/Assessment and Plan:   Acute pancreatitis: Episode 1 year ago in [facility] requiring 1 week hospitalization, thought to be alcohol at that time.  No alcohol in many months.  Epigastric pain for 1 day duration with elevated lipase consistent with acute pancreatitis.  Abdominal US did not show any gallstones.  Triglycerides not elevated.  Does not take any medications or over-the-counter supplements.  No family history of pancreatitis.  She does not smoke.  -[facility] consulted  -IgG4 added.  If negative likely needs EUS evaluation in 6 weeks with GI  -Still moderately tender on exam for me before lunch, only drank half a small apple juice and does not want any more liquids.  Not ready for discharge  -Continue clear liquid diet given ongoing pain  -Continue IV  ml/hr  -Acetaminophen, oxycodone and IV Dilaudid as needed for pain  -Repeat CMP and lipase in the morning    Hepatic steatosis: Mild elevation in ALT.  Hepatic steatosis seen on ultrasound.  " Recommend low-fat diet and weight loss.  LDL mildly elevated at 111.    GERD: Continue PPI.    Obesity: BMI 37.5.      DVT Prophylaxis: Pneumatic Compression Devices  Code Status: Full Code  FEN: Clear liquid diet,  ml/hr  Discharge Dispo: Home  Estimated Disch Date / # of Days until Disch: Still having significant pain and not tolerating clear diet.  1-2 days        Interval History (Subjective):      Due to language barrier a professional  was used via iPad for entire encounter.    Assumed care today after mission overnight for acute pancreatitis.  Pain improved with IV Dilaudid now at 4 out of 10 in the epigastric area.  No nausea or vomiting.  Allowed clear liquid diet and drink half an apple juice.  Does not want to try anymore and has been sleeping much of the day.  She was interested in going home, however pain not improved enough and not tolerating diet.                  Physical Exam:      Last Vital Signs:  /61 (BP Location: Right arm)   Pulse 60   Temp 97.7  F (36.5  C) (Oral)   Resp 16   Ht 1.58 m (5' 2.21\")   Wt 93.8 kg (206 lb 14.4 oz)   LMP 09/21/2021   SpO2 97%   BMI 37.59 kg/m        Intake/Output Summary (Last 24 hours) at 9/25/2021 1550  Last data filed at 9/25/2021 1130  Gross per 24 hour   Intake 120 ml   Output --   Net 120 ml       Constitutional: Awake, NAD   Eyes: sclera white   HEENT:  MMM  Respiratory: no respiratory distress, lungs cta bilaterally, no crackles or wheeze  Cardiovascular: RRR.  No murmur   GI: Obese, moderately tender in the epigastric area without guarding, wincing noted, bowel sounds present, not distended  Skin: no rash    Musculoskeletal/extremities:  No edema  Neurologic: A&O, speech clear  Psychiatric: calm, cooperative, normal affect         Medications:      All current medications were reviewed with changes reflected in problem list.         Data:      All new lab and imaging data was reviewed.   Labs:  Recent Labs   Lab " 09/25/21  0031   WBC 9.7   HGB 13.8   HCT 41.9   MCV 96        Recent Labs   Lab 09/25/21  0658 09/25/21  0031   NA  --  136   POTASSIUM 3.8 3.8   CHLORIDE  --  101   CO2  --  29   ANIONGAP  --  6   GLC  --  107*   BUN  --  8   CR  --  0.63   GFRESTIMATED  --  >90   GABRIEL  --  10.4*   MAG 1.9  --    PROTTOTAL  --  8.2   ALBUMIN  --  3.8   BILITOTAL  --  0.6   ALKPHOS  --  84   AST  --  28   ALT  --  70*     Recent Labs   Lab 09/25/21 0658   CHOL 169   HDL 40*   *   TRIG 89      Imaging:   Recent Results (from the past 24 hour(s))   US Abdomen Limited (RUQ)    Narrative    EXAM: US ABDOMEN LIMITED  LOCATION: [facility]  DATE/TIME: 9/25/2021 2:24 AM    INDICATION: RUQ pain  COMPARISON: None.  TECHNIQUE: Limited abdominal ultrasound.    FINDINGS:    GALLBLADDER: Normal. No gallstones, wall thickening, or pericholecystic fluid. Negative sonographic Cohen's sign.    BILE DUCTS: No biliary dilatation. The common duct measures 4 mm.    LIVER: Increased echogenicity from diffuse fatty infiltration. No focal mass.    RIGHT KIDNEY: No hydronephrosis.    PANCREAS: The visualized portions are normal.    No ascites.      Impression    IMPRESSION:  1.  Hepatic steatosis.               Lazaor Simpson MD

## 2021-09-26 VITALS
RESPIRATION RATE: 16 BRPM | TEMPERATURE: 97.7 F | HEART RATE: 54 BPM | WEIGHT: 206.9 LBS | HEIGHT: 62 IN | SYSTOLIC BLOOD PRESSURE: 98 MMHG | BODY MASS INDEX: 38.07 KG/M2 | OXYGEN SATURATION: 97 % | DIASTOLIC BLOOD PRESSURE: 55 MMHG

## 2021-09-26 LAB
ALBUMIN SERPL-MCNC: 3 G/DL (ref 3.4–5)
ALP SERPL-CCNC: 71 U/L (ref 40–150)
ALT SERPL W P-5'-P-CCNC: 78 U/L (ref 0–50)
ANION GAP SERPL CALCULATED.3IONS-SCNC: 8 MMOL/L (ref 3–14)
AST SERPL W P-5'-P-CCNC: 41 U/L (ref 0–45)
BILIRUB SERPL-MCNC: 0.7 MG/DL (ref 0.2–1.3)
BUN SERPL-MCNC: 9 MG/DL (ref 7–30)
CALCIUM SERPL-MCNC: 8.5 MG/DL (ref 8.5–10.1)
CHLORIDE BLD-SCNC: 106 MMOL/L (ref 94–109)
CO2 SERPL-SCNC: 26 MMOL/L (ref 20–32)
CREAT SERPL-MCNC: 0.68 MG/DL (ref 0.52–1.04)
GFR SERPL CREATININE-BSD FRML MDRD: >90 ML/MIN/1.73M2
GLUCOSE BLD-MCNC: 83 MG/DL (ref 70–99)
LIPASE SERPL-CCNC: 56 U/L (ref 73–393)
POTASSIUM BLD-SCNC: 3.6 MMOL/L (ref 3.4–5.3)
PROT SERPL-MCNC: 6.6 G/DL (ref 6.8–8.8)
SODIUM SERPL-SCNC: 140 MMOL/L (ref 133–144)

## 2021-09-26 PROCEDURE — 82040 ASSAY OF SERUM ALBUMIN: CPT | Performed by: INTERNAL MEDICINE

## 2021-09-26 PROCEDURE — 99239 HOSP IP/OBS DSCHRG MGMT >30: CPT | Performed by: INTERNAL MEDICINE

## 2021-09-26 PROCEDURE — G0378 HOSPITAL OBSERVATION PER HR: HCPCS

## 2021-09-26 PROCEDURE — 36415 COLL VENOUS BLD VENIPUNCTURE: CPT | Performed by: INTERNAL MEDICINE

## 2021-09-26 PROCEDURE — 258N000003 HC RX IP 258 OP 636: Performed by: INTERNAL MEDICINE

## 2021-09-26 PROCEDURE — 83690 ASSAY OF LIPASE: CPT | Performed by: INTERNAL MEDICINE

## 2021-09-26 RX ADMIN — SODIUM CHLORIDE, POTASSIUM CHLORIDE, SODIUM LACTATE AND CALCIUM CHLORIDE: 600; 310; 30; 20 INJECTION, SOLUTION INTRAVENOUS at 08:12

## 2021-09-26 RX ADMIN — SODIUM CHLORIDE, POTASSIUM CHLORIDE, SODIUM LACTATE AND CALCIUM CHLORIDE: 600; 310; 30; 20 INJECTION, SOLUTION INTRAVENOUS at 00:03

## 2021-09-26 NOTE — PLAN OF CARE
Discharge instructions reviewed with patient and her SO using  iPad. She states she understands all instructions for discharge and follow up and has no further questions. Patient discharged to home.

## 2021-09-26 NOTE — PLAN OF CARE
"BP 98/70 (BP Location: Left arm)   Pulse 66   Temp 98.2  F (36.8  C) (Oral)   Resp 16   Ht 1.58 m (5' 2.21\")   Wt 93.8 kg (206 lb 14.4 oz)   LMP 09/21/2021   SpO2 97%   BMI 37.59 kg/m      Pt A&Ox4. Portuguese-speaking.  utilized. VSS. Denies pain, N&V. Tolerating clear liquids. Up independently. Repeat labs this AM. Will discharge home if improvement. Will cont POC.      Geraldine Martinez RN    "

## 2021-09-26 NOTE — UTILIZATION REVIEW
"  Admission Status; Secondary Review Determination         Under the authority of the Utilization Management Committee, the utilization review process indicated a secondary review on the above patient.  The review outcome is based on review of the medical records, discussions with staff, and applying clinical experience noted on the date of the review.          (x) Observation Status Appropriate - This patient does not meet hospital inpatient criteria and is placed in observation status. If this patient's primary payer is Medicare and was admitted as an inpatient, Condition Code 44 should be used and patient status changed to \"observation\".     RATIONALE FOR DETERMINATION   26-year-old woman with history of obesity and one episode of pancreatitis presenting with abdominal pain and elevated lipase concerning for pancreatitis improved overnight with plan for discharge later today.  There is no other finding or indication for prolonged inpatient hospital stay.  The severity of illness, intensity of service provided, expected LOS and risk for adverse outcome make the care appropriate for further observation; however, doesn't meet criteria for hospital inpatient admission. Dr Simpson notified of this determination.    This document was produced using voice recognition software.      The information on this document is developed by the utilization review team in order for the business office to ensure compliance.  This only denotes the appropriateness of proper admission status and does not reflect the quality of care rendered.         The definitions of Inpatient Status and Observation Status used in making the determination above are those provided in the CMS Coverage Manual, Chapter 1 and Chapter 6, section 70.4.      Sincerely,     NIALL BENDER MD    System Medical Director  Utilization Management  [facility].      "

## 2021-09-26 NOTE — DISCHARGE SUMMARY
[facility]  Discharge Summary  Name: Chelsie Pierce    MRN: [id number]  YOB: 1995    Age: 26 year old  Date of Discharge:  9/26/2021  Date of Admission: 9/25/2021  Primary Care Provider: No Ref-Primary, Physician  Discharge Physician:  Lazaro Simpson MD  Discharging Service:  Hospitalist      Discharge Diagnoses:  Acute recurrent pancreatitis  Hepatic steatosis  GERD  Obesity     Hospital Course:  Summary of Stay: Chelsie Pierce is a 26 year old female with history of obesity, GERD, and pancreatitis 1 year ago in [facility] at the time thought to be from alcohol who is now admitted on 9/25/2021 after presenting with 1 day of acute epigastric pain and found to have elevated lipase at 1,552 consistent with acute pancreatitis.  Abdominal ultrasound did not show any cholelithiasis, but did show hepatic steatosis.  Slight elevation in ALT otherwise LFTs normal.  CBC unremarkable.  Admitted for IV fluids, n.p.o. status initially, and pain control.  TGs normal, no alcohol use in many months, no gallstones.  Etiology for pancreatitis unclear, [facility] consulted, IgG4. Clinically improved and tolerating low-fat diet with no significant pain. Change to observation status due to improvement. Discharge later today.     Problem List/Assessment and Plan:   Acute pancreatitis: Episode 1 year ago in [facility] requiring 1 week hospitalization, thought to be alcohol at that time.  No alcohol in many months.  Epigastric pain for 1 day duration with elevated lipase consistent with acute pancreatitis.  Abdominal US did not show any gallstones.  Triglycerides not elevated.  Does not take any medications or over-the-counter supplements.  No family history of pancreatitis.  She does not smoke.  -[facility] consulted, follow-up with [facility] after discharge  -IgG4 added, results pending on discharge.  If negative likely needs EUS evaluation in 6 weeks with GI  -lipase  "normalized     Hepatic steatosis: Mild elevation in ALT.  Hepatic steatosis seen on ultrasound.  Recommend low-fat diet and weight loss.  LDL mildly elevated at 111. Follow with PCP with LFTs and LDL in clinic.    GERD: Continue PPI.     Obesity: BMI 37.5.     Discharge Disposition:  Discharged to home     Allergies:  No Known Allergies     Discharge Medications:   There are no discharge medications for this patient.       Condition on Discharge:  Discharge condition: Good   Discharge vitals: Blood pressure 98/55, pulse 54, temperature 97.7  F (36.5  C), temperature source Oral, resp. rate 16, height 1.58 m (5' 2.21\"), weight 93.8 kg (206 lb 14.4 oz), last menstrual period 09/21/2021, SpO2 97 %.   Code status on discharge: Full Code     History of Illness:  See detailed admission note for full details.    Physical Exam:  Blood pressure 98/55, pulse 54, temperature 97.7  F (36.5  C), temperature source Oral, resp. rate 16, height 1.58 m (5' 2.21\"), weight 93.8 kg (206 lb 14.4 oz), last menstrual period 09/21/2021, SpO2 97 %.  Wt Readings from Last 1 Encounters:   09/25/21 93.8 kg (206 lb 14.4 oz)     Constitutional: Awake, NAD   Eyes: sclera white   HEENT:  MMM  Respiratory: no respiratory distress, lungs cta bilaterally, no crackles or wheeze  Cardiovascular: RRR.  No murmur   GI: Obese, non-tender to palpation, wincing noted, bowel sounds present, not distended  Skin: no rash    Musculoskeletal/extremities:  No edema  Neurologic: A&O, speech clear  Psychiatric: calm, cooperative, normal affect    Procedures other than Imaging:  None     Imaging:  Results for orders placed or performed during the hospital encounter of 09/25/21   US Abdomen Limited (RUQ)    Narrative    EXAM: US ABDOMEN LIMITED  LOCATION: [facility]  DATE/TIME: 9/25/2021 2:24 AM    INDICATION: RUQ pain  COMPARISON: None.  TECHNIQUE: Limited abdominal ultrasound.    FINDINGS:    GALLBLADDER: Normal. No gallstones, wall " thickening, or pericholecystic fluid. Negative sonographic Cohen's sign.    BILE DUCTS: No biliary dilatation. The common duct measures 4 mm.    LIVER: Increased echogenicity from diffuse fatty infiltration. No focal mass.    RIGHT KIDNEY: No hydronephrosis.    PANCREAS: The visualized portions are normal.    No ascites.      Impression    IMPRESSION:  1.  Hepatic steatosis.              Consultations:  Consultation during this admission received from gastroenterology.       Recent Lab Results:  Recent Labs   Lab 09/25/21  0031   WBC 9.7   HGB 13.8   HCT 41.9   MCV 96        Recent Labs   Lab 09/26/21  0638 09/25/21  0658 09/25/21  0031     --  136   POTASSIUM 3.6 3.8 3.8   CHLORIDE 106  --  101   CO2 26  --  29   ANIONGAP 8  --  6   GLC 83  --  107*   BUN 9  --  8   CR 0.68  --  0.63   GFRESTIMATED >90  --  >90   GABRIEL 8.5  --  10.4*   MAG  --  1.9  --    PROTTOTAL 6.6*  --  8.2   ALBUMIN 3.0*  --  3.8   BILITOTAL 0.7  --  0.6   ALKPHOS 71  --  84   AST 41  --  28   ALT 78*  --  70*     Recent Labs   Lab 09/26/21  0638 09/25/21 0031   LIPASE 56* 1,552*     Recent Labs   Lab 09/25/21 0658   CHOL 169   HDL 40*   *   TRIG 89       COVID19 pcr negative  Ethanol level undetectable       Pending Results:    Unresulted Labs Ordered in the Past 30 Days of this Admission     Date and Time Order Name Status Description    9/25/2021  2:13 PM IgG In process     9/25/2021  2:13 PM Immunoglobulin G subclasses In process          These results will be followed up by patient's primary care provider.    Discharge Instructions and Follow-Up:   Discharge Procedure Orders   Reason for your hospital stay   Order Comments: You were hospitalized for recurrent pancreatitis.  This improved with IV fluids and bowel rest.  The cause is unclear and you have a pending lab test to look for autoimmune causes.  If this is negative the gastroenterologist will likely recommend an out patient endoscopic ultrasound.  It is  important to follow up with [facility].  Recommend no alcohol use moving forward.     Follow-up and recommended labs and tests    Order Comments: Follow up with primary care provider, Physician No Ref-Primary, within 7-14 days for hospital follow- up, hepatic steatosis, pancreatitis.  The following labs/tests are recommended: LFTs, lipid panel    Follow up with [facility] for recurrent pancreatitis.     Activity   Order Comments: Your activity upon discharge: activity as tolerated     Order Specific Question Answer Comments   Is discharge order? Yes      Full Code     Order Specific Question Answer Comments   Code status determined by: Discussion with patient/ legal decision maker      Diet   Order Comments: Follow this diet upon discharge: Orders Placed This Encounter      Low Fat Diet     Order Specific Question Answer Comments   Is discharge order? Yes          I, Lazaro Simpson MD, personally saw the patient today and spent greater than or equal to 30 minutes discharging this patient.    Lazaro Simpson MD

## 2021-09-27 LAB — IGG SERPL-MCNC: 1259 MG/DL (ref 610–1616)

## 2021-09-28 LAB
IGG SERPL-MCNC: 1257 MG/DL (ref 610–1616)
IGG1 SER-MCNC: 794 MG/DL (ref 382–929)
IGG2 SER-MCNC: 331 MG/DL (ref 242–700)
IGG3 SER-MCNC: 81 MG/DL (ref 22–176)
IGG4 SER-MCNC: 50 MG/DL (ref 4–86)
SUBCLASSES, PERCENT: 100 %

## 2023-06-27 ENCOUNTER — APPOINTMENT (OUTPATIENT)
Dept: ULTRASOUND IMAGING | Facility: CLINIC | Age: 28
End: 2023-06-27
Attending: INTERNAL MEDICINE
Payer: MEDICAID

## 2023-06-27 ENCOUNTER — HOSPITAL ENCOUNTER (INPATIENT)
Facility: CLINIC | Age: 28
LOS: 4 days | Discharge: HOME | End: 2023-07-01
Attending: EMERGENCY MEDICINE | Admitting: INTERNAL MEDICINE
Payer: MEDICAID

## 2023-06-27 ENCOUNTER — APPOINTMENT (OUTPATIENT)
Dept: CT IMAGING | Facility: CLINIC | Age: 28
End: 2023-06-27
Attending: EMERGENCY MEDICINE
Payer: MEDICAID

## 2023-06-27 DIAGNOSIS — E11.9 DIABETES MELLITUS, NEW ONSET (H): ICD-10-CM

## 2023-06-27 DIAGNOSIS — R73.9 HYPERGLYCEMIA: ICD-10-CM

## 2023-06-27 DIAGNOSIS — K85.90 ACUTE PANCREATITIS, UNSPECIFIED COMPLICATION STATUS, UNSPECIFIED PANCREATITIS TYPE: ICD-10-CM

## 2023-06-27 LAB
ALBUMIN SERPL BCG-MCNC: 3.9 G/DL (ref 3.5–5.2)
ALBUMIN SERPL BCG-MCNC: 4.7 G/DL (ref 3.5–5.2)
ALBUMIN UR-MCNC: 30 MG/DL
ALP SERPL-CCNC: 124 U/L (ref 35–104)
ALP SERPL-CCNC: 99 U/L (ref 35–104)
ALT SERPL W P-5'-P-CCNC: 119 U/L (ref 0–50)
ALT SERPL W P-5'-P-CCNC: 84 U/L (ref 0–50)
ANION GAP SERPL CALCULATED.3IONS-SCNC: 10 MMOL/L (ref 7–15)
ANION GAP SERPL CALCULATED.3IONS-SCNC: 16 MMOL/L (ref 7–15)
APPEARANCE UR: ABNORMAL
AST SERPL W P-5'-P-CCNC: 33 U/L (ref 0–45)
AST SERPL W P-5'-P-CCNC: 63 U/L (ref 0–45)
B-OH-BUTYR SERPL-SCNC: <0.18 MMOL/L
BACTERIA #/AREA URNS HPF: ABNORMAL /HPF
BASOPHILS # BLD AUTO: 0 10E3/UL (ref 0–0.2)
BASOPHILS NFR BLD AUTO: 0 %
BILIRUB SERPL-MCNC: 0.4 MG/DL
BILIRUB SERPL-MCNC: 0.5 MG/DL
BILIRUB UR QL STRIP: NEGATIVE
BUN SERPL-MCNC: 11.1 MG/DL (ref 6–20)
BUN SERPL-MCNC: 9.2 MG/DL (ref 6–20)
CALCIUM SERPL-MCNC: 10.1 MG/DL (ref 8.6–10)
CALCIUM SERPL-MCNC: 8.7 MG/DL (ref 8.6–10)
CHLORIDE SERPL-SCNC: 102 MMOL/L (ref 98–107)
CHLORIDE SERPL-SCNC: 96 MMOL/L (ref 98–107)
COLOR UR AUTO: YELLOW
CREAT SERPL-MCNC: 0.46 MG/DL (ref 0.51–0.95)
CREAT SERPL-MCNC: 0.48 MG/DL (ref 0.51–0.95)
DEPRECATED HCO3 PLAS-SCNC: 24 MMOL/L (ref 22–29)
DEPRECATED HCO3 PLAS-SCNC: 24 MMOL/L (ref 22–29)
EOSINOPHIL # BLD AUTO: 0 10E3/UL (ref 0–0.7)
EOSINOPHIL NFR BLD AUTO: 0 %
ERYTHROCYTE [DISTWIDTH] IN BLOOD BY AUTOMATED COUNT: 12.5 % (ref 10–15)
ERYTHROCYTE [DISTWIDTH] IN BLOOD BY AUTOMATED COUNT: 12.7 % (ref 10–15)
GFR SERPL CREATININE-BSD FRML MDRD: >90 ML/MIN/1.73M2
GFR SERPL CREATININE-BSD FRML MDRD: >90 ML/MIN/1.73M2
GLUCOSE BLDC GLUCOMTR-MCNC: 206 MG/DL (ref 70–99)
GLUCOSE BLDC GLUCOMTR-MCNC: 224 MG/DL (ref 70–99)
GLUCOSE BLDC GLUCOMTR-MCNC: 279 MG/DL (ref 70–99)
GLUCOSE BLDC GLUCOMTR-MCNC: 282 MG/DL (ref 70–99)
GLUCOSE BLDC GLUCOMTR-MCNC: 324 MG/DL (ref 70–99)
GLUCOSE BLDC GLUCOMTR-MCNC: 355 MG/DL (ref 70–99)
GLUCOSE SERPL-MCNC: 312 MG/DL (ref 70–99)
GLUCOSE SERPL-MCNC: 365 MG/DL (ref 70–99)
GLUCOSE UR STRIP-MCNC: >=1000 MG/DL
HBA1C MFR BLD: 10.3 %
HCG SERPL QL: NEGATIVE
HCO3 BLDV-SCNC: 28 MMOL/L (ref 21–28)
HCT VFR BLD AUTO: 38.6 % (ref 35–47)
HCT VFR BLD AUTO: 43.4 % (ref 35–47)
HGB BLD-MCNC: 12.6 G/DL (ref 11.7–15.7)
HGB BLD-MCNC: 14.6 G/DL (ref 11.7–15.7)
HGB UR QL STRIP: NEGATIVE
HOLD SPECIMEN: NORMAL
IMM GRANULOCYTES # BLD: 0 10E3/UL
IMM GRANULOCYTES NFR BLD: 0 %
KETONES UR STRIP-MCNC: 40 MG/DL
LACTATE BLD-SCNC: 1.5 MMOL/L
LEUKOCYTE ESTERASE UR QL STRIP: ABNORMAL
LIPASE SERPL-CCNC: 930 U/L (ref 13–60)
LYMPHOCYTES # BLD AUTO: 1.5 10E3/UL (ref 0.8–5.3)
LYMPHOCYTES NFR BLD AUTO: 16 %
MCH RBC QN AUTO: 29.4 PG (ref 26.5–33)
MCH RBC QN AUTO: 29.7 PG (ref 26.5–33)
MCHC RBC AUTO-ENTMCNC: 32.6 G/DL (ref 31.5–36.5)
MCHC RBC AUTO-ENTMCNC: 33.6 G/DL (ref 31.5–36.5)
MCV RBC AUTO: 88 FL (ref 78–100)
MCV RBC AUTO: 90 FL (ref 78–100)
MONOCYTES # BLD AUTO: 0.3 10E3/UL (ref 0–1.3)
MONOCYTES NFR BLD AUTO: 3 %
MUCOUS THREADS #/AREA URNS LPF: PRESENT /LPF
NEUTROPHILS # BLD AUTO: 7.7 10E3/UL (ref 1.6–8.3)
NEUTROPHILS NFR BLD AUTO: 81 %
NITRATE UR QL: POSITIVE
NRBC # BLD AUTO: 0 10E3/UL
NRBC BLD AUTO-RTO: 0 /100
PCO2 BLDV: 45 MM HG (ref 40–50)
PH BLDV: 7.41 [PH] (ref 7.32–7.43)
PH UR STRIP: 6.5 [PH] (ref 5–7)
PLATELET # BLD AUTO: 294 10E3/UL (ref 150–450)
PLATELET # BLD AUTO: 357 10E3/UL (ref 150–450)
PO2 BLDV: 48 MM HG (ref 25–47)
POTASSIUM SERPL-SCNC: 3.9 MMOL/L (ref 3.4–5.3)
POTASSIUM SERPL-SCNC: 4 MMOL/L (ref 3.4–5.3)
PROT SERPL-MCNC: 6.7 G/DL (ref 6.4–8.3)
PROT SERPL-MCNC: 8.5 G/DL (ref 6.4–8.3)
RBC # BLD AUTO: 4.28 10E6/UL (ref 3.8–5.2)
RBC # BLD AUTO: 4.92 10E6/UL (ref 3.8–5.2)
RBC URINE: 3 /HPF
SAO2 % BLDV: 83 % (ref 94–100)
SODIUM SERPL-SCNC: 136 MMOL/L (ref 136–145)
SODIUM SERPL-SCNC: 136 MMOL/L (ref 136–145)
SP GR UR STRIP: 1.02 (ref 1–1.03)
SQUAMOUS EPITHELIAL: 6 /HPF
TRIGL SERPL-MCNC: 104 MG/DL
UROBILINOGEN UR STRIP-MCNC: NORMAL MG/DL
WBC # BLD AUTO: 9.3 10E3/UL (ref 4–11)
WBC # BLD AUTO: 9.5 10E3/UL (ref 4–11)
WBC URINE: 17 /HPF

## 2023-06-27 PROCEDURE — 120N000001 HC R&B MED SURG/OB

## 2023-06-27 PROCEDURE — 83690 ASSAY OF LIPASE: CPT | Performed by: EMERGENCY MEDICINE

## 2023-06-27 PROCEDURE — 250N000013 HC RX MED GY IP 250 OP 250 PS 637: Performed by: EMERGENCY MEDICINE

## 2023-06-27 PROCEDURE — 250N000012 HC RX MED GY IP 250 OP 636 PS 637: Performed by: INTERNAL MEDICINE

## 2023-06-27 PROCEDURE — 82962 GLUCOSE BLOOD TEST: CPT

## 2023-06-27 PROCEDURE — 84703 CHORIONIC GONADOTROPIN ASSAY: CPT | Performed by: EMERGENCY MEDICINE

## 2023-06-27 PROCEDURE — 36415 COLL VENOUS BLD VENIPUNCTURE: CPT | Performed by: INTERNAL MEDICINE

## 2023-06-27 PROCEDURE — 250N000011 HC RX IP 250 OP 636: Performed by: INTERNAL MEDICINE

## 2023-06-27 PROCEDURE — 85027 COMPLETE CBC AUTOMATED: CPT | Performed by: INTERNAL MEDICINE

## 2023-06-27 PROCEDURE — 84460 ALANINE AMINO (ALT) (SGPT): CPT | Performed by: INTERNAL MEDICINE

## 2023-06-27 PROCEDURE — 96361 HYDRATE IV INFUSION ADD-ON: CPT

## 2023-06-27 PROCEDURE — 250N000013 HC RX MED GY IP 250 OP 250 PS 637: Performed by: INTERNAL MEDICINE

## 2023-06-27 PROCEDURE — C9113 INJ PANTOPRAZOLE SODIUM, VIA: HCPCS | Mod: JZ | Performed by: INTERNAL MEDICINE

## 2023-06-27 PROCEDURE — 76705 ECHO EXAM OF ABDOMEN: CPT

## 2023-06-27 PROCEDURE — 84478 ASSAY OF TRIGLYCERIDES: CPT | Performed by: INTERNAL MEDICINE

## 2023-06-27 PROCEDURE — 84450 TRANSFERASE (AST) (SGOT): CPT | Performed by: INTERNAL MEDICINE

## 2023-06-27 PROCEDURE — 85025 COMPLETE CBC W/AUTO DIFF WBC: CPT | Performed by: EMERGENCY MEDICINE

## 2023-06-27 PROCEDURE — 250N000012 HC RX MED GY IP 250 OP 636 PS 637: Performed by: EMERGENCY MEDICINE

## 2023-06-27 PROCEDURE — 82010 KETONE BODYS QUAN: CPT | Performed by: EMERGENCY MEDICINE

## 2023-06-27 PROCEDURE — 250N000011 HC RX IP 250 OP 636: Mod: JZ | Performed by: EMERGENCY MEDICINE

## 2023-06-27 PROCEDURE — 99222 1ST HOSP IP/OBS MODERATE 55: CPT | Mod: AI | Performed by: INTERNAL MEDICINE

## 2023-06-27 PROCEDURE — 74177 CT ABD & PELVIS W/CONTRAST: CPT

## 2023-06-27 PROCEDURE — 81001 URINALYSIS AUTO W/SCOPE: CPT | Performed by: EMERGENCY MEDICINE

## 2023-06-27 PROCEDURE — 258N000003 HC RX IP 258 OP 636: Performed by: INTERNAL MEDICINE

## 2023-06-27 PROCEDURE — 258N000003 HC RX IP 258 OP 636: Performed by: EMERGENCY MEDICINE

## 2023-06-27 PROCEDURE — 99285 EMERGENCY DEPT VISIT HI MDM: CPT | Mod: 25

## 2023-06-27 PROCEDURE — 87086 URINE CULTURE/COLONY COUNT: CPT | Performed by: EMERGENCY MEDICINE

## 2023-06-27 PROCEDURE — 82803 BLOOD GASES ANY COMBINATION: CPT

## 2023-06-27 PROCEDURE — 250N000011 HC RX IP 250 OP 636: Mod: JZ | Performed by: INTERNAL MEDICINE

## 2023-06-27 PROCEDURE — 36415 COLL VENOUS BLD VENIPUNCTURE: CPT | Performed by: EMERGENCY MEDICINE

## 2023-06-27 PROCEDURE — 250N000011 HC RX IP 250 OP 636: Performed by: EMERGENCY MEDICINE

## 2023-06-27 PROCEDURE — 96374 THER/PROPH/DIAG INJ IV PUSH: CPT

## 2023-06-27 PROCEDURE — 83036 HEMOGLOBIN GLYCOSYLATED A1C: CPT | Performed by: INTERNAL MEDICINE

## 2023-06-27 PROCEDURE — 82247 BILIRUBIN TOTAL: CPT | Performed by: EMERGENCY MEDICINE

## 2023-06-27 PROCEDURE — 80053 COMPREHEN METABOLIC PANEL: CPT | Performed by: EMERGENCY MEDICINE

## 2023-06-27 RX ORDER — ACETAMINOPHEN 325 MG/1
650 TABLET ORAL EVERY 6 HOURS PRN
Status: DISCONTINUED | OUTPATIENT
Start: 2023-06-27 | End: 2023-07-01 | Stop reason: HOSPADM

## 2023-06-27 RX ORDER — NALOXONE HYDROCHLORIDE 0.4 MG/ML
0.4 INJECTION, SOLUTION INTRAMUSCULAR; INTRAVENOUS; SUBCUTANEOUS
Status: DISCONTINUED | OUTPATIENT
Start: 2023-06-27 | End: 2023-07-01 | Stop reason: HOSPADM

## 2023-06-27 RX ORDER — ACETAMINOPHEN 650 MG/1
650 SUPPOSITORY RECTAL EVERY 6 HOURS PRN
Status: DISCONTINUED | OUTPATIENT
Start: 2023-06-27 | End: 2023-07-01 | Stop reason: HOSPADM

## 2023-06-27 RX ORDER — MORPHINE SULFATE 2 MG/ML
1 INJECTION, SOLUTION INTRAMUSCULAR; INTRAVENOUS
Status: DISCONTINUED | OUTPATIENT
Start: 2023-06-27 | End: 2023-06-27

## 2023-06-27 RX ORDER — OXYCODONE HYDROCHLORIDE 5 MG/1
5 TABLET ORAL EVERY 4 HOURS PRN
Status: DISCONTINUED | OUTPATIENT
Start: 2023-06-27 | End: 2023-06-27

## 2023-06-27 RX ORDER — LIDOCAINE 40 MG/G
CREAM TOPICAL
Status: DISCONTINUED | OUTPATIENT
Start: 2023-06-27 | End: 2023-07-01 | Stop reason: HOSPADM

## 2023-06-27 RX ORDER — ONDANSETRON 2 MG/ML
4 INJECTION INTRAMUSCULAR; INTRAVENOUS EVERY 6 HOURS PRN
Status: DISCONTINUED | OUTPATIENT
Start: 2023-06-27 | End: 2023-07-01 | Stop reason: HOSPADM

## 2023-06-27 RX ORDER — ONDANSETRON 4 MG/1
4 TABLET, ORALLY DISINTEGRATING ORAL EVERY 6 HOURS PRN
Status: DISCONTINUED | OUTPATIENT
Start: 2023-06-27 | End: 2023-07-01 | Stop reason: HOSPADM

## 2023-06-27 RX ORDER — MAGNESIUM HYDROXIDE/ALUMINUM HYDROXICE/SIMETHICONE 120; 1200; 1200 MG/30ML; MG/30ML; MG/30ML
15 SUSPENSION ORAL ONCE
Status: COMPLETED | OUTPATIENT
Start: 2023-06-27 | End: 2023-06-27

## 2023-06-27 RX ORDER — MORPHINE SULFATE 4 MG/ML
4 INJECTION, SOLUTION INTRAMUSCULAR; INTRAVENOUS
Status: DISCONTINUED | OUTPATIENT
Start: 2023-06-27 | End: 2023-07-01

## 2023-06-27 RX ORDER — MAGNESIUM HYDROXIDE/ALUMINUM HYDROXICE/SIMETHICONE 120; 1200; 1200 MG/30ML; MG/30ML; MG/30ML
30 SUSPENSION ORAL EVERY 4 HOURS PRN
Status: DISCONTINUED | OUTPATIENT
Start: 2023-06-27 | End: 2023-07-01 | Stop reason: HOSPADM

## 2023-06-27 RX ORDER — MORPHINE SULFATE 2 MG/ML
2 INJECTION, SOLUTION INTRAMUSCULAR; INTRAVENOUS
Status: DISCONTINUED | OUTPATIENT
Start: 2023-06-27 | End: 2023-06-27

## 2023-06-27 RX ORDER — MORPHINE SULFATE 2 MG/ML
2 INJECTION, SOLUTION INTRAMUSCULAR; INTRAVENOUS
Status: DISCONTINUED | OUTPATIENT
Start: 2023-06-27 | End: 2023-07-01 | Stop reason: HOSPADM

## 2023-06-27 RX ORDER — OXYCODONE HYDROCHLORIDE 5 MG/1
5 TABLET ORAL EVERY 4 HOURS PRN
Status: DISCONTINUED | OUTPATIENT
Start: 2023-06-27 | End: 2023-07-01 | Stop reason: HOSPADM

## 2023-06-27 RX ORDER — NALOXONE HYDROCHLORIDE 0.4 MG/ML
0.2 INJECTION, SOLUTION INTRAMUSCULAR; INTRAVENOUS; SUBCUTANEOUS
Status: DISCONTINUED | OUTPATIENT
Start: 2023-06-27 | End: 2023-07-01 | Stop reason: HOSPADM

## 2023-06-27 RX ORDER — IOPAMIDOL 755 MG/ML
100 INJECTION, SOLUTION INTRAVASCULAR ONCE
Status: COMPLETED | OUTPATIENT
Start: 2023-06-27 | End: 2023-06-27

## 2023-06-27 RX ORDER — DEXTROSE MONOHYDRATE 25 G/50ML
25-50 INJECTION, SOLUTION INTRAVENOUS
Status: DISCONTINUED | OUTPATIENT
Start: 2023-06-27 | End: 2023-06-29

## 2023-06-27 RX ORDER — NICOTINE POLACRILEX 4 MG
15-30 LOZENGE BUCCAL
Status: DISCONTINUED | OUTPATIENT
Start: 2023-06-27 | End: 2023-06-29

## 2023-06-27 RX ORDER — ONDANSETRON 2 MG/ML
4 INJECTION INTRAMUSCULAR; INTRAVENOUS ONCE
Status: COMPLETED | OUTPATIENT
Start: 2023-06-27 | End: 2023-06-27

## 2023-06-27 RX ORDER — OXYCODONE HYDROCHLORIDE 5 MG/1
10 TABLET ORAL EVERY 4 HOURS PRN
Status: DISCONTINUED | OUTPATIENT
Start: 2023-06-27 | End: 2023-07-01

## 2023-06-27 RX ORDER — SODIUM CHLORIDE 9 MG/ML
INJECTION, SOLUTION INTRAVENOUS CONTINUOUS
Status: DISCONTINUED | OUTPATIENT
Start: 2023-06-27 | End: 2023-06-30

## 2023-06-27 RX ADMIN — SODIUM CHLORIDE 1000 ML: 9 INJECTION, SOLUTION INTRAVENOUS at 00:32

## 2023-06-27 RX ADMIN — INSULIN ASPART 2 UNITS: 100 INJECTION, SOLUTION INTRAVENOUS; SUBCUTANEOUS at 21:38

## 2023-06-27 RX ADMIN — SODIUM CHLORIDE: 9 INJECTION, SOLUTION INTRAVENOUS at 04:56

## 2023-06-27 RX ADMIN — MORPHINE SULFATE 2 MG: 2 INJECTION, SOLUTION INTRAMUSCULAR; INTRAVENOUS at 12:28

## 2023-06-27 RX ADMIN — OXYCODONE HYDROCHLORIDE 5 MG: 5 TABLET ORAL at 09:55

## 2023-06-27 RX ADMIN — INSULIN GLARGINE 5 UNITS: 100 INJECTION, SOLUTION SUBCUTANEOUS at 21:39

## 2023-06-27 RX ADMIN — MORPHINE SULFATE 2 MG: 2 INJECTION, SOLUTION INTRAMUSCULAR; INTRAVENOUS at 06:35

## 2023-06-27 RX ADMIN — ALUMINUM HYDROXIDE, MAGNESIUM HYDROXIDE, AND DIMETHICONE 15 ML: 200; 20; 200 SUSPENSION ORAL at 05:37

## 2023-06-27 RX ADMIN — OXYCODONE HYDROCHLORIDE 10 MG: 5 TABLET ORAL at 19:29

## 2023-06-27 RX ADMIN — SODIUM CHLORIDE: 9 INJECTION, SOLUTION INTRAVENOUS at 15:05

## 2023-06-27 RX ADMIN — SODIUM CHLORIDE 1000 ML: 9 INJECTION, SOLUTION INTRAVENOUS at 04:24

## 2023-06-27 RX ADMIN — INSULIN ASPART 3 UNITS: 100 INJECTION, SOLUTION INTRAVENOUS; SUBCUTANEOUS at 12:24

## 2023-06-27 RX ADMIN — OXYCODONE HYDROCHLORIDE 5 MG: 5 TABLET ORAL at 15:09

## 2023-06-27 RX ADMIN — INSULIN ASPART 3 UNITS: 100 INJECTION, SOLUTION INTRAVENOUS; SUBCUTANEOUS at 08:12

## 2023-06-27 RX ADMIN — INSULIN ASPART 4 UNITS: 100 INJECTION, SOLUTION INTRAVENOUS; SUBCUTANEOUS at 05:36

## 2023-06-27 RX ADMIN — PANTOPRAZOLE SODIUM 40 MG: 40 INJECTION, POWDER, FOR SOLUTION INTRAVENOUS at 08:11

## 2023-06-27 RX ADMIN — ONDANSETRON 4 MG: 2 INJECTION INTRAMUSCULAR; INTRAVENOUS at 06:35

## 2023-06-27 RX ADMIN — INSULIN ASPART 2 UNITS: 100 INJECTION, SOLUTION INTRAVENOUS; SUBCUTANEOUS at 17:48

## 2023-06-27 RX ADMIN — ONDANSETRON 4 MG: 2 INJECTION INTRAMUSCULAR; INTRAVENOUS at 00:32

## 2023-06-27 RX ADMIN — INSULIN GLARGINE 5 UNITS: 100 INJECTION, SOLUTION SUBCUTANEOUS at 08:12

## 2023-06-27 RX ADMIN — IOPAMIDOL 100 ML: 755 INJECTION, SOLUTION INTRAVENOUS at 06:08

## 2023-06-27 ASSESSMENT — ACTIVITIES OF DAILY LIVING (ADL)
ADLS_ACUITY_SCORE: 20
ADLS_ACUITY_SCORE: 35
ADLS_ACUITY_SCORE: 20
ADLS_ACUITY_SCORE: 35
ADLS_ACUITY_SCORE: 37
ADLS_ACUITY_SCORE: 35

## 2023-06-27 NOTE — PROGRESS NOTES
Patient Transfer Information  Patient connected to monitoring equipment on arrival: yes Vital signs monitor     Patient connected to wall oxygen on arrival: N/A    Belongings: Transferred with patient    Safety check completed: Yes

## 2023-06-27 NOTE — PHARMACY-ADMISSION MEDICATION HISTORY
Pharmacist Admission Medication History    Admission medication history is complete. The information provided in this note is only as accurate as the sources available at the time of the update.    Medication reconciliation/reorder completed by provider prior to medication history? No    Information Source(s): Patient and CareEverywhere/SureScripts via N/A   Per chart review, ED provider notes and H&P, pt not taking any RX medications. Nothing found via chart review or surescripts.    Pertinent Information: none    Medication History Completed By:     Rubia Montesinos PharmD, [facility]   Emergency Medicine Pharmacist  [phone] or Melany  June 27, 2023    Prior to Admission medications    Not on File

## 2023-06-27 NOTE — ED PROVIDER NOTES
History   Chief Complaint:  Abdominal Pain    The history is provided by the patient. A  was used (Pakistani).      Chelsie Pierce is a 27 year old female who presents with abdominal pain and vomiting. The patient reports that the nausea, vomiting, and abdominal pain onset yesterday morning (6/26).  The pain is epigastric.  This is constant, sharp, nonradiating.  The patient does have a history of a prior admission for pancreatitis.  No recent alcohol use.  She denies any fever, diarrhea, or polyuria, hematuria, or dysuria.  No chest pain or shortness of breath.  No syncope.  No recent dietary changes.  Her mother's side of her family has a history of diabetes.     Review of External Notes:   Discharge summary reviewed from September 25, 2021 when the patient was admitted with pancreatitis.  At that time she was not on any medications and noted to have diabetes.    Medications:    The patient is currently on no regular medications.     Past Medical History:    The patient denies any past medical history.      Physical Exam     Patient Vitals for the past 24 hrs:   BP Temp Pulse Resp SpO2   06/27/23 0530 (!) 146/82 -- 89 -- 97 %   06/27/23 0515 138/80 -- 96 -- 99 %   06/27/23 0445 (!) 144/81 -- 83 -- 97 %   06/27/23 0424 -- -- -- -- 95 %   06/27/23 0423 133/72 -- 85 -- --   06/27/23 0028 121/82 99.1  F (37.3  C) 102 18 95 %      Physical Exam  Constitutional:       General: She is not in acute distress.     Appearance: Normal appearance. She is not diaphoretic.   HENT:      Head: Atraumatic.      Mouth/Throat:      Mouth: Mucous membranes are moist.   Eyes:      General: No scleral icterus.     Conjunctiva/sclera: Conjunctivae normal.   Cardiovascular:      Rate and Rhythm: Normal rate and regular rhythm.      Heart sounds: Normal heart sounds.   Pulmonary:      Effort: No respiratory distress.      Breath sounds: Normal breath sounds.   Abdominal:      General: Abdomen is flat.       Comments: Epigastric tenderness.  No distention.  No guarding or rebound.   Musculoskeletal:      Cervical back: Neck supple.      Right lower leg: No edema.      Left lower leg: No edema.   Skin:     General: Skin is warm.      Capillary Refill: Capillary refill takes less than 2 seconds.      Findings: No rash.   Neurological:      General: No focal deficit present.      Mental Status: She is alert and oriented to person, place, and time.   Psychiatric:         Mood and Affect: Mood normal.         Behavior: Behavior normal.           Emergency Department Course   Imaging:  CT Abdomen Pelvis w Contrast   Final Result   IMPRESSION:    1.  Acute pancreatitis.      2.  Mild hepatic steatosis.         Report per radiology    Laboratory:  Labs Ordered and Resulted from Time of ED Arrival to Time of ED Departure   ROUTINE UA WITH MICROSCOPIC REFLEX TO CULTURE - Abnormal       Result Value    Color Urine Yellow      Appearance Urine Slightly Cloudy (*)     Glucose Urine >=1000 (*)     Bilirubin Urine Negative      Ketones Urine 40 (*)     Specific Gravity Urine 1.020      Blood Urine Negative      pH Urine 6.5      Protein Albumin Urine 30 (*)     Urobilinogen Urine Normal      Nitrite Urine Positive (*)     Leukocyte Esterase Urine Trace (*)     Bacteria Urine Few (*)     Mucus Urine Present (*)     RBC Urine 3 (*)     WBC Urine 17 (*)     Squamous Epithelials Urine 6 (*)    COMPREHENSIVE METABOLIC PANEL - Abnormal    Sodium 136      Potassium 4.0      Chloride 96 (*)     Carbon Dioxide (CO2) 24      Anion Gap 16 (*)     Urea Nitrogen 11.1      Creatinine 0.48 (*)     Calcium 10.1 (*)     Glucose 365 (*)     Alkaline Phosphatase 124 (*)     AST 63 (*)      (*)     Protein Total 8.5 (*)     Albumin 4.7      Bilirubin Total 0.5      GFR Estimate >90     LIPASE - Abnormal    Lipase 930 (*)    ISTAT GASES LACTATE VENOUS POCT - Abnormal    Lactic Acid POCT 1.5      Bicarbonate Venous POCT 28      O2 Sat, Venous POCT  83 (*)     pCO2 Venous POCT 45      pH Venous POCT 7.41      pO2 Venous POCT 48 (*)    GLUCOSE BY METER - Abnormal    GLUCOSE BY METER POCT 355 (*)    GLUCOSE BY METER - Abnormal    GLUCOSE BY METER POCT 324 (*)    HEMOGLOBIN A1C - Abnormal    Hemoglobin A1C 10.3 (*)    HCG QUALITATIVE PREGNANCY - Normal    hCG Serum Qualitative Negative     KETONE BETA-HYDROXYBUTYRATE QUANTITATIVE, RAPID - Normal    Ketone (Beta-Hydroxybutyrate) Quantitative <0.18     CBC WITH PLATELETS AND DIFFERENTIAL    WBC Count 9.5      RBC Count 4.92      Hemoglobin 14.6      Hematocrit 43.4      MCV 88      MCH 29.7      MCHC 33.6      RDW 12.5      Platelet Count 357      % Neutrophils 81      % Lymphocytes 16      % Monocytes 3      % Eosinophils 0      % Basophils 0      % Immature Granulocytes 0      NRBCs per 100 WBC 0      Absolute Neutrophils 7.7      Absolute Lymphocytes 1.5      Absolute Monocytes 0.3      Absolute Eosinophils 0.0      Absolute Basophils 0.0      Absolute Immature Granulocytes 0.0      Absolute NRBCs 0.0     COMPREHENSIVE METABOLIC PANEL   CBC WITH PLATELETS   TRIGLYCERIDES   GLUCOSE MONITOR NURSING POCT   GLUCOSE MONITOR NURSING POCT   GLUCOSE MONITOR NURSING POCT   GLUCOSE MONITOR NURSING POCT   GLUCOSE MONITOR NURSING POCT   URINE CULTURE     Emergency Department Course & Assessments:  Interventions:  Medications   sodium chloride 0.9% infusion ( Intravenous $New Bag 6/27/23 0456)   lidocaine 1 % 0.1-1 mL (has no administration in time range)   lidocaine (LMX4) cream (has no administration in time range)   sodium chloride (PF) 0.9% PF flush 3 mL (3 mLs Intracatheter Not Given 6/27/23 0628)   sodium chloride (PF) 0.9% PF flush 3 mL (has no administration in time range)   acetaminophen (TYLENOL) tablet 650 mg (has no administration in time range)     Or   acetaminophen (TYLENOL) Suppository 650 mg (has no administration in time range)   magnesium hydroxide (MILK OF MAGNESIA) suspension 30 mL (has no administration  in time range)   ondansetron (ZOFRAN ODT) ODT tab 4 mg ( Oral See Alternative 6/27/23 0635)     Or   ondansetron (ZOFRAN) injection 4 mg (4 mg Intravenous $Given 6/27/23 0635)   alum & mag hydroxide-simethicone (MAALOX) suspension 30 mL (has no administration in time range)   oxyCODONE IR (ROXICODONE) half-tab 2.5 mg (has no administration in time range)   oxyCODONE (ROXICODONE) tablet 5 mg (has no administration in time range)   morphine (PF) injection 1 mg (has no administration in time range)   morphine (PF) injection 2 mg (2 mg Intravenous $Given 6/27/23 0635)   insulin glargine (LANTUS PEN) injection 5 Units (has no administration in time range)   glucose gel 15-30 g (has no administration in time range)     Or   dextrose 50 % injection 25-50 mL (has no administration in time range)     Or   glucagon injection 1 mg (has no administration in time range)   insulin aspart (NovoLOG) injection (RAPID ACTING) (has no administration in time range)   pantoprazole (PROTONIX) IV push injection 40 mg (has no administration in time range)   ondansetron (ZOFRAN) injection 4 mg (4 mg Intravenous $Given 6/27/23 0032)   0.9% sodium chloride BOLUS (0 mLs Intravenous Stopped 6/27/23 0425)   0.9% sodium chloride BOLUS (0 mLs Intravenous Stopped 6/27/23 0455)   insulin aspart (NovoLOG) injection (RAPID ACTING) (4 Units Subcutaneous $Given 6/27/23 0536)   alum & mag hydroxide-simethicone (MAALOX) suspension 15 mL (15 mLs Oral $Given 6/27/23 0537)   iopamidol (ISOVUE-370) solution 100 mL (100 mLs Intravenous $Given 6/27/23 0608)   sodium chloride (PF) 0.9% PF flush 65 mL (65 mLs Intravenous $Given 6/27/23 0610)     Assessments:  0402 I obtained history and examined the patient as reported above.   0454 I rechecked the patient and discussed her admission to the hospital.      Consultations/Discussion of Management or Tests:  0452 I spoke with Dr. Chance of the hospitalist service from [facility] regarding patient's presentation,  findings, and plan of care.    Disposition:  The patient was admitted to the hospital under the care of Dr. Chance.     Impression & Plan    Medical Decision Making:  This patient is a 27-year-old who presents with epigastric pain.  Lipase is elevated consistent with recurrent pancreatitis.  It is concerning today is that she appears have new onset diabetes.  Lab work-up is not consistent with DKA.  There is no clear source of infection.  We will get a CT scan to further evaluate the pancreas.    The patient was given IV fluids and insulin to treat the hyperglycemia.    The patient will be admitted to the hospital service for work-up and management of her diabetes and to further evaluate and treat her pancreatitis.    Diagnosis:    ICD-10-CM    1. Acute pancreatitis, unspecified complication status, unspecified pancreatitis type  K85.90       2. Diabetes mellitus, new onset (H)  E11.9       3. Hyperglycemia  R73.9          Scribe Disclosure:  I, Akira Heart, am serving as a scribe at 3:56 AM on 6/27/2023 to document services personally performed by David Long MD based on my observations and the provider's statements to me.      6/27/2023   David Long MD McRoberts, Sean Edward, MD  06/27/23 0637

## 2023-06-27 NOTE — ED NOTES
[facility]  ED Nurse Handoff Report    ED Chief complaint: Abdominal Pain  . ED Diagnosis:   Final diagnoses:   Acute pancreatitis, unspecified complication status, unspecified pancreatitis type   Diabetes mellitus, new onset (H)   Hyperglycemia       Allergies: No Known Allergies    Code Status: Full Code    Activity level - Baseline/Home:  independent.  Activity Level - Current:   independent.   Lift room needed: No.   Bariatric: No   Needed: Yes   Isolation: No.   Infection: Not Applicable.     Respiratory status: Room air    Vital Signs (within 30 minutes):   Vitals:    06/27/23 0028 06/27/23 0423 06/27/23 0424 06/27/23 0445   BP: 121/82 133/72  (!) 144/81   Pulse: 102 85  83   Resp: 18      Temp: 99.1  F (37.3  C)      SpO2: 95%  95% 97%       Cardiac Rhythm:  ,      Pain level:    Patient confused: No.   Patient Falls Risk: nonskid shoes/slippers when out of bed.   Elimination Status: Has voided     Patient Report - Initial Complaint: Abdominal Pain.   Focused Assessment: Per provider notes:  Abdominal Pain     The history is provided by the patient. A  was used (Bruneian).      Chelsie Pierce is a 27 year old female who presents with abdominal pain and vomiting. The patient reports that the nausea, vomiting, and abdominal pain onset yesterday morning (6/26).  She denies any fever, diarrhea, or urinary changes. Her mother's side of her family has a history of diabetes    Abnormal Results:   Labs Ordered and Resulted from Time of ED Arrival to Time of ED Departure   ROUTINE UA WITH MICROSCOPIC REFLEX TO CULTURE - Abnormal       Result Value    Color Urine Yellow      Appearance Urine Slightly Cloudy (*)     Glucose Urine >=1000 (*)     Bilirubin Urine Negative      Ketones Urine 40 (*)     Specific Gravity Urine 1.020      Blood Urine Negative      pH Urine 6.5      Protein Albumin Urine 30 (*)     Urobilinogen Urine Normal      Nitrite Urine  Positive (*)     Leukocyte Esterase Urine Trace (*)     Bacteria Urine Few (*)     Mucus Urine Present (*)     RBC Urine 3 (*)     WBC Urine 17 (*)     Squamous Epithelials Urine 6 (*)    COMPREHENSIVE METABOLIC PANEL - Abnormal    Sodium 136      Potassium 4.0      Chloride 96 (*)     Carbon Dioxide (CO2) 24      Anion Gap 16 (*)     Urea Nitrogen 11.1      Creatinine 0.48 (*)     Calcium 10.1 (*)     Glucose 365 (*)     Alkaline Phosphatase 124 (*)     AST 63 (*)      (*)     Protein Total 8.5 (*)     Albumin 4.7      Bilirubin Total 0.5      GFR Estimate >90     LIPASE - Abnormal    Lipase 930 (*)    ISTAT GASES LACTATE VENOUS POCT - Abnormal    Lactic Acid POCT 1.5      Bicarbonate Venous POCT 28      O2 Sat, Venous POCT 83 (*)     pCO2 Venous POCT 45      pH Venous POCT 7.41      pO2 Venous POCT 48 (*)    GLUCOSE BY METER - Abnormal    GLUCOSE BY METER POCT 355 (*)    HCG QUALITATIVE PREGNANCY - Normal    hCG Serum Qualitative Negative     KETONE BETA-HYDROXYBUTYRATE QUANTITATIVE, RAPID - Normal    Ketone (Beta-Hydroxybutyrate) Quantitative <0.18     CBC WITH PLATELETS AND DIFFERENTIAL    WBC Count 9.5      RBC Count 4.92      Hemoglobin 14.6      Hematocrit 43.4      MCV 88      MCH 29.7      MCHC 33.6      RDW 12.5      Platelet Count 357      % Neutrophils 81      % Lymphocytes 16      % Monocytes 3      % Eosinophils 0      % Basophils 0      % Immature Granulocytes 0      NRBCs per 100 WBC 0      Absolute Neutrophils 7.7      Absolute Lymphocytes 1.5      Absolute Monocytes 0.3      Absolute Eosinophils 0.0      Absolute Basophils 0.0      Absolute Immature Granulocytes 0.0      Absolute NRBCs 0.0     URINE CULTURE        CT Abdomen Pelvis w Contrast    (Results Pending)       Treatments provided: See MAR  Family Comments: At bedside  OBS brochure/video discussed/provided to patient:  N/A  ED Medications:   Medications   sodium chloride 0.9% infusion ( Intravenous $New Bag 6/27/23 3192)   insulin  aspart (NovoLOG) injection (RAPID ACTING) (has no administration in time range)   ondansetron (ZOFRAN) injection 4 mg (4 mg Intravenous $Given 6/27/23 0032)   0.9% sodium chloride BOLUS (0 mLs Intravenous Stopped 6/27/23 0425)   0.9% sodium chloride BOLUS (0 mLs Intravenous Stopped 6/27/23 0455)       Drips infusing:  Yes  For the majority of the shift this patient was Green.   Interventions performed were NA.    Sepsis treatment initiated: No    Cares/treatment/interventions/medications to be completed following ED care: NA    ED Nurse Name: Eve Yu RN  4:58 AM    RECEIVING UNIT ED HANDOFF REVIEW    Above ED Nurse Handoff Report was reviewed: Yes  Reviewed by: Crystal Rayo RN on June 27, 2023 at 6:05 PM

## 2023-06-27 NOTE — PROGRESS NOTES
I admitted Ms Tim Pierce earlier this am  Still with abdominal pain but nausea has improved  CT confirms pancreatitis    TGs wnl, gb looks normal on CT.  No home medications, no trauma, no significant alcohol intake     ? Rolled stone-check US  Increased morphine an oxycodone doses for better pain management   Ok for clears-go slow    Update and re-examination completed with the help of [employer]

## 2023-06-27 NOTE — H&P
History and Physical     Chelsie Pierce MRN# [id number]   YOB: 1995 Age: 27 year old      Date of Admission:  6/27/2023    Primary care provider: No Ref-Primary, Physician          Assessment and Plan:     Summary of Stay: Chelsie Pierce is a 27 year old Romanian speaking female without PMH admitted on 6/27/2023 with n/v and abdominal pain and found to have pancreatitis/gastritis and a new diagnosis of T2dm     She woke up yesterday morning with epigastric pain.  Her pain worsened with eating but she was able to take in fluids ok.  She had associated n/v to the point that she couldn't keep anything down. She's had a few chills but no fevers or sweats.     She occasionally drinks alcohol.  She's apparently had a case of pancreatitis in the past but the details are unknown    ER evaluation VS wnl, temp is slightly up at 99.1, respiratory status is wnl   CMP notable for elevate AG 16 with CO2 24. LA is 1.5, ketones undetectable   LFTs diffusely elevated alk phos 124, AST/ALT 63/119, bili is wnl.   Lipase 930    CBC wnl  Pregnancy testing is negative     UA positive for nitrites trace LE 3 rbcs few bacteria and 6 SECs so the specimen is contaminated   CT a/p ordered     Problem List:   Abdominal pain  Gastritis vs pancreatitis vs combination   -await CT results, does not drink much alcohol, will check TGs   -NPO, IVF, IV pain and nausea control    Hyperglycemia   Almost certainly T2dm denies any sx of polydipsia or polyuria  Given the degree of elevation of her BG here I suspect she'll need insulin to start with, although could be really high due to acute medical process  -check hgb A1c and if < 9.0 consider initiation of oral agents  -for now will treat with glargine 5 unit(s) bid and ISS    Abnormal UA  Most consistent with contamination, no empiric abx    AG with normal CO2 and VBG  Not clear what's causing it:  LA and ketones negative. Bun wnl  Doubt  alcohol/methanol/isoniazid/asa  -recheck cmp in am after BGs controlled and better hydrated       Elevated LFTs   Not in a pattern consistent with etoh or obstructive process.  CT might shed some evidence on etiology  -recheck in am         COVID 19   No documentation of vaccination in our system     DVT Prophylaxis: Enoxaparin (Lovenox) SQ  Code Status: Full Code  Functional Status: independent  Canales: not needed  Access:   PIV        A phone  was used during this interview and exam       Time spent 65 minutes reviewing epic including notes/labs/prior hx, current medications.  In addition to interviewing and examining the patient, updated patient and family regarding plan of care          Chief Complaint:     N/v/abdominal pain        History of Present Illness:   Chelsie Pierce is a 27 year old Croatian speaking female without PMH admitted on 6/27/2023 with n/v and abdominal pain and found to have pancreatitis/gastritis and a new diagnosis of T2dm     She woke up yesterday morning with epigastric pain.  Her pain worsened with eating but she was able to take in fluids ok.  She had associated n/v to the point that she couldn't keep anything down. She's had a few chills but no fevers or sweats.     She occasionally drinks alcohol.  She's apparently had a case of pancreatitis in the past but the details are unknown    ER evaluation VS wnl, temp is slightly up at 99.1, respiratory status is wnl   CMP notable for elevate AG 16 with CO2 24. LA is 1.5, ketones undetectable   LFTs diffusely elevated alk phos 124, AST/ALT 63/119, bili is wnl.   Lipase 930    CBC wnl  Pregnancy testing is negative       UA positive for nitrites trace LE 3 rbcs few bacteria and 6 SECs so the specimen is contaminated   CT a/p ordered       The history is obtained in discussion with the ER provider Dr David Long and the patient with good reliability       Epic and Care everywhere were extensively reviewed         Past Medical History:     Past Medical History:   Diagnosis Date     Known health problems: none              Past Surgical History:     Past Surgical History:   Procedure Laterality Date     NO HISTORY OF SURGERY               Social History:     Social History     Tobacco Use     Smoking status: Never     Smokeless tobacco: Not on file   Substance Use Topics     Alcohol use: Yes     Comment: sometimes             Family History:   I have reviewed this patient's family history           Allergies:   No Known Allergies          Medications:        none       Review of Systems:     A Comprehensive greater than 10 system review of systems was carried out.  Pertinent positives and negatives are noted above.  Otherwise negative for contributory information.           Physical Exam:   Blood pressure (!) 144/81, pulse 83, temperature 99.1  F (37.3  C), resp. rate 18, SpO2 97 %.  Exam:    General:  Pleasant nad looks stated age  HEENT:  Head nc/at sclera clear PERRL O/P:  Mask in place  Neck is supple  Lungs: cta b nl effort   CV:  RRR no m/r/g no le edema  Abd:  Mildly distended, ttp in epigastric region, bs hypoactive   Neuro:  Cn 2-12 grossly intact and nguyen  Alert and oriented affect appropriate   Skin:  W/d no c/c               Data:     Results for orders placed or performed during the hospital encounter of 06/27/23   UA with Microscopic reflex to Culture     Status: Abnormal    Specimen: Urine, Clean Catch   Result Value Ref Range    Color Urine Yellow Colorless, Straw, Light Yellow, Yellow    Appearance Urine Slightly Cloudy (A) Clear    Glucose Urine >=1000 (A) Negative mg/dL    Bilirubin Urine Negative Negative    Ketones Urine 40 (A) Negative mg/dL    Specific Gravity Urine 1.020 1.003 - 1.035    Blood Urine Negative Negative    pH Urine 6.5 5.0 - 7.0    Protein Albumin Urine 30 (A) Negative mg/dL    Urobilinogen Urine Normal Normal, 2.0 mg/dL    Nitrite Urine Positive (A) Negative    Leukocyte Esterase Urine  Trace (A) Negative    Bacteria Urine Few (A) None Seen /HPF    Mucus Urine Present (A) None Seen /LPF    RBC Urine 3 (H) <=2 /HPF    WBC Urine 17 (H) <=5 /HPF    Squamous Epithelials Urine 6 (H) <=1 /HPF    Narrative    Urine Culture ordered based on laboratory criteria   Comprehensive metabolic panel     Status: Abnormal   Result Value Ref Range    Sodium 136 136 - 145 mmol/L    Potassium 4.0 3.4 - 5.3 mmol/L    Chloride 96 (L) 98 - 107 mmol/L    Carbon Dioxide (CO2) 24 22 - 29 mmol/L    Anion Gap 16 (H) 7 - 15 mmol/L    Urea Nitrogen 11.1 6.0 - 20.0 mg/dL    Creatinine 0.48 (L) 0.51 - 0.95 mg/dL    Calcium 10.1 (H) 8.6 - 10.0 mg/dL    Glucose 365 (H) 70 - 99 mg/dL    Alkaline Phosphatase 124 (H) 35 - 104 U/L    AST 63 (H) 0 - 45 U/L     (H) 0 - 50 U/L    Protein Total 8.5 (H) 6.4 - 8.3 g/dL    Albumin 4.7 3.5 - 5.2 g/dL    Bilirubin Total 0.5 <=1.2 mg/dL    GFR Estimate >90 >60 mL/min/1.73m2   HCG QUALitative pregnancy (blood)     Status: Normal   Result Value Ref Range    hCG Serum Qualitative Negative Negative   Extra Tube (Dike Draw)     Status: None    Narrative    The following orders were created for panel order Extra Tube (Dike Draw).  Procedure                               Abnormality         Status                     ---------                               -----------         ------                     Extra Blue Top Tube[[id number]]                              Final result                 Please view results for these tests on the individual orders.   CBC with platelets and differential     Status: None   Result Value Ref Range    WBC Count 9.5 4.0 - 11.0 10e3/uL    RBC Count 4.92 3.80 - 5.20 10e6/uL    Hemoglobin 14.6 11.7 - 15.7 g/dL    Hematocrit 43.4 35.0 - 47.0 %    MCV 88 78 - 100 fL    MCH 29.7 26.5 - 33.0 pg    MCHC 33.6 31.5 - 36.5 g/dL    RDW 12.5 10.0 - 15.0 %    Platelet Count 357 150 - 450 10e3/uL    % Neutrophils 81 %    % Lymphocytes 16 %    % Monocytes 3 %    % Eosinophils  0 %    % Basophils 0 %    % Immature Granulocytes 0 %    NRBCs per 100 WBC 0 <1 /100    Absolute Neutrophils 7.7 1.6 - 8.3 10e3/uL    Absolute Lymphocytes 1.5 0.8 - 5.3 10e3/uL    Absolute Monocytes 0.3 0.0 - 1.3 10e3/uL    Absolute Eosinophils 0.0 0.0 - 0.7 10e3/uL    Absolute Basophils 0.0 0.0 - 0.2 10e3/uL    Absolute Immature Granulocytes 0.0 <=0.4 10e3/uL    Absolute NRBCs 0.0 10e3/uL   Extra Blue Top Tube     Status: None   Result Value Ref Range    Hold Specimen JI    Lipase     Status: Abnormal   Result Value Ref Range    Lipase 930 (H) 13 - 60 U/L   Ketone Beta-Hydroxybutyrate Quantitative     Status: Normal   Result Value Ref Range    Ketone (Beta-Hydroxybutyrate) Quantitative <0.18 <=0.30 mmol/L   iStat Gases (lactate) venous, POCT     Status: Abnormal   Result Value Ref Range    Lactic Acid POCT 1.5 <=2.0 mmol/L    Bicarbonate Venous POCT 28 21 - 28 mmol/L    O2 Sat, Venous POCT 83 (L) 94 - 100 %    pCO2 Venous POCT 45 40 - 50 mm Hg    pH Venous POCT 7.41 7.32 - 7.43    pO2 Venous POCT 48 (H) 25 - 47 mm Hg   Glucose by meter     Status: Abnormal   Result Value Ref Range    GLUCOSE BY METER POCT 355 (H) 70 - 99 mg/dL   Glucose by meter     Status: Abnormal   Result Value Ref Range    GLUCOSE BY METER POCT 324 (H) 70 - 99 mg/dL   CBC with Platelets & Differential     Status: None    Narrative    The following orders were created for panel order CBC with Platelets & Differential.  Procedure                               Abnormality         Status                     ---------                               -----------         ------                     CBC with platelets and d...[[id number]]                      Final result                 Please view results for these tests on the individual orders.

## 2023-06-27 NOTE — PLAN OF CARE
[facility]    ED Boarding Nurse Handoff Addendum Report:    Date/time: 6/27/2023, 5:23 PM    Activity Level: assist of 1    Fall Risk: Yes:  nonskid shoes/slippers when out of bed, patient and family education and activity supervised    Active Infusions:  NS @ 100ml/hr    Current Meds Due: Novolog    Current care needs: Pain control, BS checks ACHS    Oxygen requirements (liters/min and/or FiO2): None    Respiratory status: Room air    Vital signs (within last 30 minutes):    Vitals:    06/27/23 0757 06/27/23 1226 06/27/23 1230 06/27/23 1636   BP: 120/79 119/80 119/80 135/85   BP Location: Right arm   Right arm   Pulse: 89   77   Resp: 16   20   Temp: 98.4  F (36.9  C)   97.5  F (36.4  C)   TempSrc: Oral   Oral   SpO2:  97% 96% 97%       Focused assessment within last 30 minutes:    Pt alert and oriented x4. Colombian speaking, knows some english, significant other at bedside helping translate. Complains of some pain, managed with PRN oxy. Sleeping/resting in between cares.    ED Boarding Nurse name: Sri Chery RN

## 2023-06-28 LAB
BACTERIA UR CULT: ABNORMAL
GLUCOSE BLDC GLUCOMTR-MCNC: 146 MG/DL (ref 70–99)
GLUCOSE BLDC GLUCOMTR-MCNC: 155 MG/DL (ref 70–99)
GLUCOSE BLDC GLUCOMTR-MCNC: 155 MG/DL (ref 70–99)
GLUCOSE BLDC GLUCOMTR-MCNC: 156 MG/DL (ref 70–99)
GLUCOSE BLDC GLUCOMTR-MCNC: 164 MG/DL (ref 70–99)
GLUCOSE BLDC GLUCOMTR-MCNC: 181 MG/DL (ref 70–99)

## 2023-06-28 PROCEDURE — 99233 SBSQ HOSP IP/OBS HIGH 50: CPT | Performed by: HOSPITALIST

## 2023-06-28 PROCEDURE — 250N000011 HC RX IP 250 OP 636: Performed by: INTERNAL MEDICINE

## 2023-06-28 PROCEDURE — 120N000001 HC R&B MED SURG/OB

## 2023-06-28 PROCEDURE — C9113 INJ PANTOPRAZOLE SODIUM, VIA: HCPCS | Mod: JZ | Performed by: INTERNAL MEDICINE

## 2023-06-28 PROCEDURE — 258N000003 HC RX IP 258 OP 636: Performed by: INTERNAL MEDICINE

## 2023-06-28 PROCEDURE — 250N000013 HC RX MED GY IP 250 OP 250 PS 637: Performed by: INTERNAL MEDICINE

## 2023-06-28 RX ADMIN — INSULIN ASPART 1 UNITS: 100 INJECTION, SOLUTION INTRAVENOUS; SUBCUTANEOUS at 22:04

## 2023-06-28 RX ADMIN — OXYCODONE HYDROCHLORIDE 10 MG: 5 TABLET ORAL at 09:20

## 2023-06-28 RX ADMIN — INSULIN GLARGINE 5 UNITS: 100 INJECTION, SOLUTION SUBCUTANEOUS at 09:13

## 2023-06-28 RX ADMIN — INSULIN ASPART 1 UNITS: 100 INJECTION, SOLUTION INTRAVENOUS; SUBCUTANEOUS at 19:31

## 2023-06-28 RX ADMIN — MORPHINE SULFATE 2 MG: 2 INJECTION, SOLUTION INTRAMUSCULAR; INTRAVENOUS at 14:45

## 2023-06-28 RX ADMIN — SODIUM CHLORIDE: 9 INJECTION, SOLUTION INTRAVENOUS at 10:13

## 2023-06-28 RX ADMIN — OXYCODONE HYDROCHLORIDE 10 MG: 5 TABLET ORAL at 05:13

## 2023-06-28 RX ADMIN — MORPHINE SULFATE 2 MG: 2 INJECTION, SOLUTION INTRAMUSCULAR; INTRAVENOUS at 10:14

## 2023-06-28 RX ADMIN — INSULIN ASPART 1 UNITS: 100 INJECTION, SOLUTION INTRAVENOUS; SUBCUTANEOUS at 13:39

## 2023-06-28 RX ADMIN — OXYCODONE HYDROCHLORIDE 5 MG: 5 TABLET ORAL at 19:29

## 2023-06-28 RX ADMIN — OXYCODONE HYDROCHLORIDE 10 MG: 5 TABLET ORAL at 13:35

## 2023-06-28 RX ADMIN — MORPHINE SULFATE 4 MG: 4 INJECTION INTRAVENOUS at 22:00

## 2023-06-28 RX ADMIN — INSULIN ASPART 1 UNITS: 100 INJECTION, SOLUTION INTRAVENOUS; SUBCUTANEOUS at 10:10

## 2023-06-28 RX ADMIN — PANTOPRAZOLE SODIUM 40 MG: 40 INJECTION, POWDER, FOR SOLUTION INTRAVENOUS at 09:13

## 2023-06-28 RX ADMIN — MORPHINE SULFATE 2 MG: 2 INJECTION, SOLUTION INTRAMUSCULAR; INTRAVENOUS at 20:14

## 2023-06-28 RX ADMIN — OXYCODONE HYDROCHLORIDE 10 MG: 5 TABLET ORAL at 00:24

## 2023-06-28 RX ADMIN — SODIUM CHLORIDE: 9 INJECTION, SOLUTION INTRAVENOUS at 00:31

## 2023-06-28 RX ADMIN — INSULIN ASPART 1 UNITS: 100 INJECTION, SOLUTION INTRAVENOUS; SUBCUTANEOUS at 06:46

## 2023-06-28 RX ADMIN — INSULIN ASPART 1 UNITS: 100 INJECTION, SOLUTION INTRAVENOUS; SUBCUTANEOUS at 02:14

## 2023-06-28 ASSESSMENT — ACTIVITIES OF DAILY LIVING (ADL)
ADLS_ACUITY_SCORE: 21
ADLS_ACUITY_SCORE: 20
ADLS_ACUITY_SCORE: 21
ADLS_ACUITY_SCORE: 20
ADLS_ACUITY_SCORE: 21
ADLS_ACUITY_SCORE: 21
ADLS_ACUITY_SCORE: 20

## 2023-06-28 NOTE — PROGRESS NOTES
[facility]    Hospitalist Progress Note             Date of Admission:  6/27/2023                   Day of hospitalization: 1    Assessment and Plan:   Chelsie Pierce is a 27 year old Divehi speaking female without PMH admitted on 6/27/2023 with n/v and abdominal pain and found to have pancreatitis/gastritis and a new diagnosis of T2dm      She woke up yesterday morning with epigastric pain.  Her pain worsened with eating but she was able to take in fluids ok.  She had associated n/v to the point that she couldn't keep anything down. She's had a few chills but no fevers or sweats.      She occasionally drinks alcohol.  She's apparently had a case of pancreatitis in the past but the details are unknown     ER evaluation VS wnl, temp is slightly up at 99.1, respiratory status is wnl   CMP notable for elevate AG 16 with CO2 24. LA is 1.5, ketones undetectable   LFTs diffusely elevated alk phos 124, AST/ALT 63/119, bili is wnl.   Lipase 930    CBC wnl  Pregnancy testing is negative      UA positive for nitrites trace LE 3 rbcs few bacteria and 6 SECs so the specimen is contaminated   CT a/p ordered      Problem List:   Acute pancreatitis  -Possibly passed stone?  Triglycerides okay, no heavy alcohol use  -CT abdomen pelvis with pancreatitis hepatic steatosis no biliary ductal dilation and ultrasound no cholelithiasis  -Currently on clears IVF, IV pain and nausea control     Diabetes mellitus type 2  -hemoglobin A1c at 10.  Patient lacks insurance we will consult pharmacy for insulin coverage  -Social work consulted  -Currently on Lantus 5 units twice daily, changed to 5 units daily     Abnormal UA  Most consistent with contamination, no empiric abx    Elevated LFTs   CT abdomen shows hepatic steatosis  Improved monitor     # Code status: Full  # Anticipated discharge date and Disposition: 1 to 2 days  # DVT: SCDs  # IVF:  normal saline at 100 mL/h                      Ani C.  MD Yamil  Text Page (7am - 6pm, M-F)               Subjective   Chief Complaint:  Abdominal pain   subjective: continues to have abdominal pain.  No nausea no vomiting not able to tolerate clears significantly yet.  Denies any chest pain shortness of breath.  Discussed usage of insulin in the setting of new diagnosis of diabetes.          Objective   /76 (BP Location: Right arm)   Pulse 51   Temp 97.8  F (36.6  C) (Oral)   Resp 18   Wt 90.3 kg (199 lb 1.6 oz)   SpO2 94%   BMI 36.18 kg/m       Physical Exam  General: Pt in NAD, normal appearance  HEENT: OP clear MMM, no JVD  Lungs: Clear to Auscultation Bilateral, normal breathing  without accessory muscle usage, no wheezing, rhonchi or crackles  Cardiac: +S1, S2, RRR, no MRG, no edema  Abdominal: normal bowel sounds, positive for epigastric tenderness/ND, no hepatosplenomegaly  Skin: warm, dry, normal turgor, no rash  Psyche: A& O x3, appropriate affect             Intake/Output Summary (Last 24 hours) at 6/28/2023 1124  Last data filed at 6/28/2023 0940  Gross per 24 hour   Intake --   Output 1500 ml   Net -1500 ml           Labs and Imaging Results:      Recent Labs   Lab 06/27/23  0807 06/27/23  0030   WBC 9.3 9.5   HGB 12.6 14.6    357        Recent Labs   Lab 06/27/23  0807 06/27/23  0030    136   CO2 24 24   BUN 9.2 11.1      No results for input(s): INR, PTT in the last 168 hours.   No results for input(s): CKMB in the last 168 hours.    Invalid input(s): TROPONINT     Recent Labs   Lab 06/27/23  0807 06/27/23  0030   ALBUMIN 3.9 4.7   AST 33 63*   ALT 84* 119*   ALKPHOS 99 124*        Micro:     Radio:  US Abdomen Limited   Final Result   IMPRESSION:   1.  No evidence of cholelithiasis or biliary obstruction.   2.  Hepatic steatosis.      LAURA CESPEDES MD            SYSTEM ID:  [id number]      CT Abdomen Pelvis w Contrast   Final Result   IMPRESSION:    1.  Acute pancreatitis.      2.  Mild hepatic steatosis.               Medications:      Scheduled Meds:      insulin aspart  1-6 Units Subcutaneous Q4H     [START ON 6/29/2023] insulin glargine  5 Units Subcutaneous QAM AC     pantoprazole  40 mg Intravenous Daily with breakfast     sodium chloride (PF)  3 mL Intracatheter Q8H     Continuous Infusions:      sodium chloride 100 mL/hr at 06/28/23 1013     PRN Meds:  acetaminophen **OR** acetaminophen, alum & mag hydroxide-simethicone, glucose **OR** dextrose **OR** glucagon, lidocaine 4%, lidocaine (buffered or not buffered), magnesium hydroxide, morphine, morphine, naloxone **OR** naloxone **OR** naloxone **OR** naloxone, ondansetron **OR** ondansetron, oxyCODONE, oxyCODONE IR, sodium chloride (PF)

## 2023-06-28 NOTE — PLAN OF CARE
[id number]: VSS. RA, JOSH clear. A&Ox4. Romanian speaking, needs  service. Pt c/o 9/10 abdominal pain; managed with prn oxy. Up SBA noc for safety precautions. Clear liquids. BG checks q4h, 2 units given for shift. No nausea or vomiting. PIV intact, NS infusing @100cc/hr. Safety checks completed and call light within reach. Will continue to monitor.

## 2023-06-28 NOTE — CONSULTS
Patient is currently uninsured and in need of insulin therapy.       Cheapest available options:     Rapid Acting  Humalog pens or vials:  $35 per 1 month supply (using [email or web address] copay card).       Intermediate  Humulin N Kwikpen:   $35 per 1 month supply (using insulinaffordability.[employer] copay card).       Basal  Basaglar pens or vials: $35 per 1 month supply (using insulinaffMobile MessengerabilityWhisper copay card)).     Discharge Pharmacy will dispense the AccuChek Guide glucometer and supplies upon receipt of RX.  100 test strips are $30, lancets $15, control solution $12, meter $12.50.       These prices are only valid if they are filled at [facility] upon discharge.  Any price reduction cards will be sent with the filled order for the patient to take to the pharmacy of their choice for subsequent fills.     Discharge Pharmacy can provide these and all discharge meds at no upfront cost.  Patient will receive a bill in the mail from [facility] for the above costs of the medications that are dispensed.  Patient can call the number on the bill to set up a payment plan if needed.     -RELL Estes, Pharmacy Technician/Liaison, Discharge Pharmacy, [phone]

## 2023-06-28 NOTE — PLAN OF CARE
Goal Outcome Evaluation:      Plan of Care Reviewed With: patient, spouse    Overall Patient Progress: no change     Temp: 98.5  F (36.9  C) Temp src: Oral BP: (!) 120/90 Pulse: 77   Resp: 18 SpO2: 98 % O2 Device: None (Room air)       Pt A/O x4, Cuban speaking. C/o epigastric pain 9/10, PRN Oxy given w/ some relief. LS clear, on RA. Up SBA to the bathroom. NS infusing @ 100 ml/hr in Right PIV. Pt on clear liquid diet, tolerating well.  and 224. Will continue POC.

## 2023-06-28 NOTE — PLAN OF CARE
/76 (BP Location: Right arm)   Pulse 51   Temp 97.8  F (36.6  C) (Oral)   Resp 18   Wt 90.3 kg (199 lb 1.6 oz)   SpO2 94%   BMI 36.18 kg/m      Neuro: a/o x4, Portuguese speaking  Cardiac: WNL  Lungs: WNL  GI: WNL  : WNL  Pain: 7-9/10- oxy and morphine available   IV: NS @100  Meds: oxy, morphine  Labs/tests: A1C 10.3  Diet: clears  Activity: SBA  Misc: q4 BG, insulin education given.   Plan: Currently resting in bed, able to make need known.

## 2023-06-29 LAB
GLUCOSE BLDC GLUCOMTR-MCNC: 141 MG/DL (ref 70–99)
GLUCOSE BLDC GLUCOMTR-MCNC: 141 MG/DL (ref 70–99)
GLUCOSE BLDC GLUCOMTR-MCNC: 142 MG/DL (ref 70–99)
GLUCOSE BLDC GLUCOMTR-MCNC: 160 MG/DL (ref 70–99)
GLUCOSE BLDC GLUCOMTR-MCNC: 185 MG/DL (ref 70–99)

## 2023-06-29 PROCEDURE — 250N000011 HC RX IP 250 OP 636: Performed by: INTERNAL MEDICINE

## 2023-06-29 PROCEDURE — 250N000013 HC RX MED GY IP 250 OP 250 PS 637: Performed by: INTERNAL MEDICINE

## 2023-06-29 PROCEDURE — 258N000003 HC RX IP 258 OP 636: Performed by: INTERNAL MEDICINE

## 2023-06-29 PROCEDURE — 120N000001 HC R&B MED SURG/OB

## 2023-06-29 PROCEDURE — 99232 SBSQ HOSP IP/OBS MODERATE 35: CPT | Performed by: INTERNAL MEDICINE

## 2023-06-29 PROCEDURE — C9113 INJ PANTOPRAZOLE SODIUM, VIA: HCPCS | Mod: JZ | Performed by: INTERNAL MEDICINE

## 2023-06-29 PROCEDURE — 250N000011 HC RX IP 250 OP 636: Mod: JZ | Performed by: INTERNAL MEDICINE

## 2023-06-29 RX ORDER — DEXTROSE MONOHYDRATE 25 G/50ML
25-50 INJECTION, SOLUTION INTRAVENOUS
Status: DISCONTINUED | OUTPATIENT
Start: 2023-06-29 | End: 2023-07-01 | Stop reason: HOSPADM

## 2023-06-29 RX ORDER — BLOOD PRESSURE TEST KIT
KIT MISCELLANEOUS
Qty: 100 EACH | Refills: 0 | Status: SHIPPED | OUTPATIENT
Start: 2023-06-29

## 2023-06-29 RX ORDER — NICOTINE POLACRILEX 4 MG
15-30 LOZENGE BUCCAL
Status: DISCONTINUED | OUTPATIENT
Start: 2023-06-29 | End: 2023-07-01 | Stop reason: HOSPADM

## 2023-06-29 RX ADMIN — SODIUM CHLORIDE: 9 INJECTION, SOLUTION INTRAVENOUS at 06:13

## 2023-06-29 RX ADMIN — OXYCODONE HYDROCHLORIDE 5 MG: 5 TABLET ORAL at 06:47

## 2023-06-29 RX ADMIN — OXYCODONE HYDROCHLORIDE 10 MG: 5 TABLET ORAL at 11:32

## 2023-06-29 RX ADMIN — INSULIN ASPART 1 UNITS: 100 INJECTION, SOLUTION INTRAVENOUS; SUBCUTANEOUS at 06:10

## 2023-06-29 RX ADMIN — ONDANSETRON 4 MG: 4 TABLET, ORALLY DISINTEGRATING ORAL at 00:34

## 2023-06-29 RX ADMIN — PANTOPRAZOLE SODIUM 40 MG: 40 INJECTION, POWDER, FOR SOLUTION INTRAVENOUS at 08:32

## 2023-06-29 RX ADMIN — FAMOTIDINE 20 MG: 10 INJECTION, SOLUTION INTRAVENOUS at 08:32

## 2023-06-29 RX ADMIN — FAMOTIDINE 20 MG: 10 INJECTION, SOLUTION INTRAVENOUS at 20:16

## 2023-06-29 RX ADMIN — SODIUM CHLORIDE: 9 INJECTION, SOLUTION INTRAVENOUS at 15:48

## 2023-06-29 RX ADMIN — MORPHINE SULFATE 4 MG: 4 INJECTION INTRAVENOUS at 01:36

## 2023-06-29 RX ADMIN — MORPHINE SULFATE 2 MG: 2 INJECTION, SOLUTION INTRAMUSCULAR; INTRAVENOUS at 09:04

## 2023-06-29 RX ADMIN — OXYCODONE HYDROCHLORIDE 10 MG: 5 TABLET ORAL at 22:29

## 2023-06-29 RX ADMIN — INSULIN ASPART 1 UNITS: 100 INJECTION, SOLUTION INTRAVENOUS; SUBCUTANEOUS at 02:12

## 2023-06-29 RX ADMIN — MORPHINE SULFATE 4 MG: 4 INJECTION INTRAVENOUS at 23:02

## 2023-06-29 RX ADMIN — OXYCODONE HYDROCHLORIDE 10 MG: 5 TABLET ORAL at 16:59

## 2023-06-29 RX ADMIN — OXYCODONE HYDROCHLORIDE 5 MG: 5 TABLET ORAL at 00:34

## 2023-06-29 ASSESSMENT — ACTIVITIES OF DAILY LIVING (ADL)
ADLS_ACUITY_SCORE: 21
ADLS_ACUITY_SCORE: 20
ADLS_ACUITY_SCORE: 21
ADLS_ACUITY_SCORE: 21
ADLS_ACUITY_SCORE: 20

## 2023-06-29 NOTE — PLAN OF CARE
Pt alert and awake, speaks Norwegian only.    Cardiac - WNL  Lungs - WNL  GI - WNL   -WNL  Pain - meds oxy and morphine  IV -   Diet - Clears  Activity -SBA   BG q 4 hrs, insulin given.   Pt relaxing in room

## 2023-06-29 NOTE — PROGRESS NOTES
[facility]    Medicine Progress Note - Hospitalist Service    Date of Admission:  6/27/2023    Assessment & Plan    Chelsie Pierce is a 27 year old Citizen of [place] speaking female without PMH admitted on 6/27/2023 with n/v and abdominal pain and found to have pancreatitis/gastritis and a new diagnosis of T2dm      She woke up yesterday morning with epigastric pain.  Her pain worsened with eating but she was able to take in fluids ok.  She had associated n/v to the point that she couldn't keep anything down. She's had a few chills but no fevers or sweats.      She occasionally drinks alcohol.  She's apparently had a case of pancreatitis in the past but the details are unknown     ER evaluation VS wnl, temp is slightly up at 99.1, respiratory status is wnl   CMP notable for elevate AG 16 with CO2 24. LA is 1.5, ketones undetectable   LFTs diffusely elevated alk phos 124, AST/ALT 63/119, bili is wnl.   Lipase 930    CBC wnl  Pregnancy testing is negative      UA positive for nitrites trace LE 3 rbcs few bacteria and 6 SECs so the specimen is contaminated   CT a/p ordered      Problem List:   Acute pancreatitis  -Possibly passed stone?  Triglycerides okay, no heavy alcohol use  -CT abdomen pelvis with pancreatitis hepatic steatosis no biliary ductal dilation and ultrasound no cholelithiasis  -Added IV Pepcid  -Continue IV fluids  -LFTs trending down  -Advance to full liquids  -Discussed with her importance of complete cessation of any alcohol use to remove distal variable that might potentially leading to her pancreatitis     Diabetes mellitus type 2  -hemoglobin A1c at 10.  Patient lacks insurance we will consult pharmacy for insulin coverage  -Social work consulted  -Patient presented here with uncontrolled diabetes mellitus likely will benefit for initial insulin management that can be continued upon discharge.  -Discussed with her importance of close follow-up care and also touch base  with patient's spouse over the phone  -Will need diabetes education.  Close follow-up care, will need to learn how to check her blood sugar and administering insulin dosing.  -Potentially can be started on metformin as outpatient and trial of oral diabetic regimen in the next coming weeks upon follow-up.     Abnormal UA  Most consistent with contamination, no empiric abx     Elevated LFTs   CT abdomen shows hepatic steatosis  Improved monitor     # Code status: Full  #This lady will still be needing at least another day of inpatient care  # DVT: SCDs  # IVF:  normal saline at 100 mL/h                            Diet: Full Liquid Diet    DVT Prophylaxis: Pneumatic Compression Devices  Canales Catheter: Not present  Lines: None     Cardiac Monitoring: None  Code Status: Full Code      Clinically Significant Risk Factors                        # DMII: A1C = 10.3 % (Ref range: <5.7 %) within past 6 months, PRESENT ON ADMISSION                     Castro Baron MD, MD  Hospitalist Service  [facility]  Securely message with [employer] (more info)  Text page via [employer] Paging/Directory   ______________________________________________________________________    Interval History     I assume service care today for this very pleasant young  lady.  She mentioned that she was able to sleep for couple of hours but still having intermittent abdominal discomfort but denies any nausea or vomiting this morning.  She is passing flatus.  Voiding freely.  She is ambulatory with no assistance.  She is not requiring any oxygen support.  Currently afebrile.  Denies any shortness of breath, chest pain.      Physical Exam   Vital Signs: Temp: 98.5  F (36.9  C) Temp src: Oral BP: 120/86 Pulse: 75   Resp: 16 SpO2: 97 % O2 Device: None (Room air)    Weight: 199 lbs 1.6 oz    Obese  HEENT; Atraumatic, normocephalic, pinkish conjuctiva, pupils bilateral reactive   Skin: warm and moist, no rashes  Lymphatics: no  cervical or axillary lymphandenopathy  Lungs: equal chest expansion, clear to auscultation, no wheezes, no stridor, no crackles,   Heart: normal rate, normal rhythm, no rubs or gallops.   Abdomen:  minimal discomfort upon palpation at the epigastric area, no guarding, positive bowel sounds  Extremities: no deformities, no edema   Neuro; follow commands, alert and oriented x3, spontaneous speech, coherent, moves all extremities spontaneously  Psych; no hallucination, euthymic mood, not agitated        Medical Decision Making       45 MINUTES SPENT BY ME on the date of service doing chart review, history, exam, documentation & further activities per the note.  MANAGEMENT DISCUSSED with the following over the past 24 hours: yes   NOTE(S)/MEDICAL RECORDS REVIEWED over the past 24 hours: yes  Tests ORDERED & REVIEWED in the past 24 hours:      Data         Imaging results reviewed over the past 24 hrs:   No results found for this or any previous visit (from the past 24 hour(s)).

## 2023-06-29 NOTE — PLAN OF CARE
/82 (BP Location: Right arm)   Pulse 80   Temp 98.2  F (36.8  C) (Oral)   Resp 16   Wt 95.2 kg (209 lb 12.8 oz)   SpO2 95%   BMI 38.12 kg/m      Neuro: a/o x4- Macedonian speaking   Cardiac: WNL  Lungs: WNL  GI: WNL  : WNL  Pain: 6-9/10- oxy and morphine available   IV: NS @100  Meds: oxy, morphine, pepcid  Diet: full liquid   Activity: SBA  Misc: BG with meals and bed.   Plan: Currently resting in bed, able to make need known.         -paperwork for [employer] dropped off yesterday. Spoke with them today and they said they would contact patient with an  about paperwork.

## 2023-06-29 NOTE — PLAN OF CARE
Goal Outcome Evaluation:      Plan of Care Reviewed With: patient    Overall Patient Progress: improvingOverall Patient Progress: improving     Vitals: /86 (BP Location: Right arm)   Pulse 75   Temp 98.5  F (36.9  C) (Oral)   Resp 16   Wt 90.3 kg (199 lb 1.6 oz)   SpO2 97%   BMI 36.18 kg/m      Primary Diagnosis: Hyperglycemia  Pain: 9/10 pain in abdomen, PO oxycodone and IV morphine given  Orientation: A&Ox4, soft spoken  GI/: Continent  LDA: LFA infusing NS @ 100 mL/hr  Skin: WDL  Bowel sounds: Active  Respiratory: Diminished  Diet: Clear liquids w/ carb count  Activity: SBA  Plan: US abdomen positive for hepatic steatosis. Patient experiencing significant epigastric pain. PO zofran given and PRN pain meds per orders. Will continue POC.

## 2023-06-29 NOTE — PROGRESS NOTES
Pt alert and orientated, c/o of abdomen pain 10/10 morphine given.  Pt speaks Guamanian only. New diagnosis of type 2 Diabetes,.  NS at 100ml.

## 2023-06-30 LAB
GLUCOSE BLDC GLUCOMTR-MCNC: 100 MG/DL (ref 70–99)
GLUCOSE BLDC GLUCOMTR-MCNC: 105 MG/DL (ref 70–99)
GLUCOSE BLDC GLUCOMTR-MCNC: 108 MG/DL (ref 70–99)
GLUCOSE BLDC GLUCOMTR-MCNC: 127 MG/DL (ref 70–99)
GLUCOSE BLDC GLUCOMTR-MCNC: 130 MG/DL (ref 70–99)

## 2023-06-30 PROCEDURE — 258N000003 HC RX IP 258 OP 636: Performed by: INTERNAL MEDICINE

## 2023-06-30 PROCEDURE — 250N000013 HC RX MED GY IP 250 OP 250 PS 637: Performed by: INTERNAL MEDICINE

## 2023-06-30 PROCEDURE — 120N000001 HC R&B MED SURG/OB

## 2023-06-30 PROCEDURE — 99239 HOSP IP/OBS DSCHRG MGMT >30: CPT | Performed by: INTERNAL MEDICINE

## 2023-06-30 RX ORDER — INSULIN LISPRO 100 [IU]/ML
INJECTION, SOLUTION INTRAVENOUS; SUBCUTANEOUS
Qty: 10 ML | Refills: 0 | Status: SHIPPED | OUTPATIENT
Start: 2023-06-30

## 2023-06-30 RX ORDER — ONDANSETRON 4 MG/1
4 TABLET, ORALLY DISINTEGRATING ORAL EVERY 6 HOURS PRN
Qty: 15 TABLET | Refills: 0 | Status: SHIPPED | OUTPATIENT
Start: 2023-06-30

## 2023-06-30 RX ADMIN — OXYCODONE HYDROCHLORIDE 10 MG: 5 TABLET ORAL at 12:29

## 2023-06-30 RX ADMIN — OXYCODONE HYDROCHLORIDE 5 MG: 5 TABLET ORAL at 04:11

## 2023-06-30 RX ADMIN — OXYCODONE HYDROCHLORIDE 5 MG: 5 TABLET ORAL at 08:08

## 2023-06-30 RX ADMIN — SODIUM CHLORIDE: 9 INJECTION, SOLUTION INTRAVENOUS at 01:04

## 2023-06-30 RX ADMIN — OXYCODONE HYDROCHLORIDE 10 MG: 5 TABLET ORAL at 22:23

## 2023-06-30 RX ADMIN — OXYCODONE HYDROCHLORIDE 10 MG: 5 TABLET ORAL at 17:32

## 2023-06-30 ASSESSMENT — ACTIVITIES OF DAILY LIVING (ADL)
ADLS_ACUITY_SCORE: 20

## 2023-06-30 NOTE — PLAN OF CARE
Care from [id number]    Inpatient Progress Note:  For complete assessment see flow sheet documentation.    /82 (BP Location: Right arm)   Pulse 83   Temp 98.4  F (36.9  C) (Oral)   Resp 16   Wt 95.2 kg (209 lb 12.8 oz)   SpO2 95%   BMI 38.12 kg/m         Orientation: A&Ox4, Maltese speaking  Pain status: 6/10 abdominal pain, PRN medications available  Activity: SBA   Resp: WNL  Cardiac: WNL  GI: Abdominal discomfort, BS active all quadrants  : WNL  LDA: PIV  Infusions: NS @ 100ml/hr   Diet: Full liquid  Discharge Plan: Possible discharge to home tomorrow.     Will continue to monitor and provide cares.     Tanja Bledsoe RN

## 2023-06-30 NOTE — PHARMACY - DISCHARGE MEDICATION RECONCILIATION AND EDUCATION
"Discharge medication review for this patient is complete. Medication review done  See [facility] for allergy information and immunization status.   Pharmacist assisted with medication reconciliation of discharge medications with PTA medications.    Patient was educated on the following for each discharge medication:   - Pathophysiology of disease requiring treatment  - Correct dose and duration of treatment  - Compliance  - The importance of follow-up monitoring  - The potential for drug and/or diet interactions  - Adverse Effects  - Storage  - Women of childbearing Age (if applicable)    Left written materials and instructions (Clinical notes from [facility]) for new medications: No    OUTCOMES: Patient verbalized understanding    IMPORTANT FOLLOW UP NOTES:   Pharmacy: Diabetes education in new DM patient - will focus on \"survival skills\"  Current A1C 10.3. Goal A1C <7  Pt will demonstrate ability to correctly calculate, prepare & self-administer insulin  Pt will check BG three times daily before meals and at bedtime and will share with primary care physician  Reminded pt that illness/sickness could worsen blood glucose.   Pt knows how to recognize and treat signs of hypoglycemia    IMPORTANT FOLLOW UP NOTES:   -Pt will follow up with PCP for enhancement of DM regimen.       Discharge Medication List     Review of your medicines      START taking      Dose / Directions   Alcohol Swabs Pads      Use to swab the area of the injection or rambo as directed Per insurance coverage  Quantity: 100 each  Refills: 0     blood glucose calibration solution  Commonly known as: NO BRAND SPECIFIED      Used to calibrate the blood glucose monitor as needed and as directed.  To accompany  blood glucose brands per insurance coverage  Quantity: 1 each  Refills: 0     blood glucose lancets standard  Commonly known as: NO BRAND SPECIFIED      To use to test glucose level in the blood Use to test blood sugar  3 times daily as directed. To accompany " glucose monitor brands per insurance coverage.  Quantity: 100 each  Refills: 0     blood glucose monitoring meter device kit  Commonly known as: NO BRAND SPECIFIED      Check blood sugar 3x daily  Quantity: 1 kit  Refills: 0     blood glucose test strip  Commonly known as: NO BRAND SPECIFIED      To use to test glucose level in the blood Use to test blood sugar  3 times daily as directed. To accompany glucose monitor brands per insurance coverage.  Quantity: 100 strip  Refills: 0     insulin glargine 100 UNIT/ML pen  Commonly known as: LANTUS PEN      Dose: 8 Units  Start taking on: July 1, 2023  Inject 8 Units Subcutaneous every morning (before breakfast)  Quantity: 15 mL  Refills: 0     insulin lispro 100 UNIT/ML vial  Commonly known as: HumaLOG VIAL      For Pre-Meal  - 189 give 1 unit. For Pre-Meal  - 239 give 2 units. For Pre-Meal  - 289 give 3 units. For Pre-Meal  - 339 give 4 units. For Pre-Meal -399 give 5 units. For Pre-Meal -449 give 6 units. For Pre-Meal BG = or > 450 give 7 units.  Quantity: 10 mL  Refills: 0     insulin pen needle 32G X 4 MM miscellaneous  Commonly known as: 32G X 4 MM      Use as directed by provider Per insurance coverage  Quantity: 100 each  Refills: 0     ondansetron 4 MG ODT tab  Commonly known as: ZOFRAN ODT  Used for: Acute pancreatitis, unspecified complication status, unspecified pancreatitis type      Dose: 4 mg  Take 1 tablet (4 mg) by mouth every 6 hours as needed for nausea or vomiting  Quantity: 15 tablet  Refills: 0           Where to get your medicines      These medications were sent to [facility] [place] [id number] [address]    Phone: [phone]     Alcohol Swabs Pads    blood glucose calibration solution    blood glucose lancets standard    blood glucose monitoring meter device kit    blood glucose test strip    insulin glargine 100 UNIT/ML pen    insulin  lispro 100 UNIT/ML vial    insulin pen needle 32G X 4 MM miscellaneous    ondansetron 4 MG ODT tab

## 2023-06-30 NOTE — DISCHARGE SUMMARY
[facility]  Hospitalist Discharge Summary      Date of Admission:  6/27/2023  Date of Discharge:  6/30/2023  Discharging Provider: Castro Baron MD, MD  Discharge Service: Hospitalist Service    Discharge Diagnoses   Acute pancreatitis.  Unclear etiology  Transaminitis much improved  Newly diagnosed diabetes mellitus uncontrolled    Clinically Significant Risk Factors     # DMII: A1C = 10.3 % (Ref range: <5.7 %) within past 6 months       Follow-ups Needed After Discharge   Follow-up Appointments     Follow-up and recommended labs and tests       Follow up with primary care provider, Physician No Ref-Primary, within 7   days to evaluate medication change, for hospital follow- up, and regarding   new diagnosis.  The following labs/tests are recommended: Kindly bring   your blood sugar records and log upon follow-up for further adjustment of   your diabetic regimen.  Continue with low-fat diet.  Complete EtOH use cessation            Unresulted Labs Ordered in the Past 30 Days of this Admission     No orders found from 5/28/2023 to 6/28/2023.          Discharge Disposition   Discharged to home  Condition at discharge: Stable    Hospital Course      No significant reported events overnight.  Glucose levels improving.  Continuation of diabetes education.  Tolerating oral diet.  No reported worsening abdominal pain.  Stable hemodynamics.  Will advance diet and continue with low-fat diet upon discharge.  Needs close follow-up with PCP.  Complete EtOH use cessation.    I will refer you to excerpts of prior progress notes as listed below for other details of her hospitalization.      Chelsie Pierce is a 27 year old Estonian speaking female without PMH admitted on 6/27/2023 with n/v and abdominal pain and found to have pancreatitis/gastritis and a new diagnosis of T2dm      She woke up yesterday morning with epigastric pain.  Her pain worsened with eating but she was able to take in  fluids ok.  She had associated n/v to the point that she couldn't keep anything down. She's had a few chills but no fevers or sweats.      She occasionally drinks alcohol.  She's apparently had a case of pancreatitis in the past but the details are unknown     ER evaluation VS wnl, temp is slightly up at 99.1, respiratory status is wnl   CMP notable for elevate AG 16 with CO2 24. LA is 1.5, ketones undetectable   LFTs diffusely elevated alk phos 124, AST/ALT 63/119, bili is wnl.   Lipase 930    CBC wnl  Pregnancy testing is negative      UA positive for nitrites trace LE 3 rbcs few bacteria and 6 SECs so the specimen is contaminated   CT a/p ordered      Problem List:   Acute pancreatitis  -Possibly passed stone?  Triglycerides okay, no heavy alcohol use  -CT abdomen pelvis with pancreatitis hepatic steatosis no biliary ductal dilation and ultrasound no cholelithiasis  -Added IV Pepcid  -Continue IV fluids  -LFTs trending down  -Advance to full liquids  -Discussed with her importance of complete cessation of any alcohol use to remove distal variable that might potentially leading to her pancreatitis     Diabetes mellitus type 2  -hemoglobin A1c at 10.  Patient lacks insurance we will consult pharmacy for insulin coverage  -Social work consulted  -Patient presented here with uncontrolled diabetes mellitus likely will benefit for initial insulin management that can be continued upon discharge.  -Discussed with her importance of close follow-up care and also touch base with patient's spouse over the phone  -Will need diabetes education.  Close follow-up care, will need to learn how to check her blood sugar and administering insulin dosing.  -Potentially can be started on metformin as outpatient and trial of oral diabetic regimen in the next coming weeks upon follow-up.     Abnormal UA  Most consistent with contamination, no empiric abx     Elevated LFTs   CT abdomen shows hepatic steatosis  Improved monitor     #  Code status: Full  #This lady will still be needing at least another day of inpatient care  # DVT: SCDs  # IVF:  normal saline at 100 mL/h                           Consultations This Hospital Stay   PHARMACY LIAISON FOR MEDICATION COVERAGE CONSULT    Code Status   Full Code    Time Spent on this Encounter   ICastro MD, MD, personally saw the patient today and spent greater than 30 minutes discharging this patient.       Castro Baron MD, MD  [address] [facility]  [address] [place] [address]  Phone: [phone]  Fax: [phone]  ______________________________________________________________________    Physical Exam   Vital Signs: Temp: 98.2  F (36.8  C) Temp src: Oral BP: 122/70 Pulse: 76   Resp: 16 SpO2: 93 % O2 Device: None (Room air)    Weight: 209 lbs 12.8 oz  HEENT; Atraumatic, normocephalic, pinkish conjuctiva, pupils bilateral reactive   Obese  Skin: warm and moist, no rashes  Lymphatics: no cervical or axillary lymphandenopathy  Lungs: equal chest expansion, clear to auscultation, no wheezes, no stridor, no crackles,   Heart: normal rate, normal rhythm, no rubs or gallops.   Abdomen: normal bowel sounds, no tenderness, no peritoneal signs, no guarding  Extremities: no deformities, no edema   Neuro; follow commands, alert and oriented x3, spontaneous speech, coherent, moves all extremities spontaneously  Psych; no hallucination, euthymic mood, not agitated           Primary Care Physician   Physician No Ref-Primary    Discharge Orders      Reason for your hospital stay    This young lady was in the hospital for acute pancreatitis with unclear etiology.  She mentioned that she is not a heavy EtOH drinker but counseled to completely eliminate any EtOH intake to remove this a variable.  She was found with newly diagnosed diabetes mellitus uncontrolled.  Started on insulin therapy and needs to have a close follow-up as outpatient.  Further  management and titration and possible introduction of oral diabetic regimen can be pursued as outpatient.     Follow-up and recommended labs and tests     Follow up with primary care provider, Physician No Ref-Primary, within 7 days to evaluate medication change, for hospital follow- up, and regarding new diagnosis.  The following labs/tests are recommended: Kindly bring your blood sugar records and log upon follow-up for further adjustment of your diabetic regimen.  Continue with low-fat diet.  Complete EtOH use cessation     Activity    Your activity upon discharge: activity as tolerated     Full Code     Diet    Follow this diet upon discharge: Orders Placed This Encounter      Low Fat Diet       Significant Results and Procedures   Most Recent 3 CBC's:Recent Labs   Lab Test 06/27/23  0807 06/27/23  0030 09/25/21  0031   WBC 9.3 9.5 9.7   HGB 12.6 14.6 13.8   MCV 90 88 96    357 340     Most Recent 3 BMP's:Recent Labs   Lab Test 06/30/23  0229 06/29/23  2142 06/29/23  1828 06/27/23  1148 06/27/23  0807 06/27/23  0424 06/27/23  0030 09/26/21  0638   0000   NA  --   --   --   --  136  --  136 140  --    POTASSIUM  --   --   --   --  3.9  --  4.0 3.6  --    CHLORIDE  --   --   --   --  102  --  96* 106  --    CO2  --   --   --   --  24  --  24 26  --    BUN  --   --   --   --  9.2  --  11.1 9  --    CR  --   --   --   --  0.46*  --  0.48* 0.68  --    ANIONGAP  --   --   --   --  10  --  16* 8  --    GABRIEL  --   --   --   --  8.7  --  10.1* 8.5  --    * 141* 142*   < > 312*   < > 365* 83   < >    < > = values in this interval not displayed.     Most Recent 2 LFT's:Recent Labs   Lab Test 06/27/23  0807 06/27/23 0030   AST 33 63*   ALT 84* 119*   ALKPHOS 99 124*   BILITOTAL 0.4 0.5     Most Recent Hemoglobin A1c:Recent Labs   Lab Test 06/27/23  0030   A1C 10.3*     Most Recent 6 glucoses:Recent Labs   Lab Test 06/30/23  0229 06/29/23  2142 06/29/23  1828 06/29/23  1222 06/29/23  0604 06/29/23  0209   GLC  127* 141* 142* 141* 185* 160*   ,   Results for orders placed or performed during the hospital encounter of 06/27/23   CT Abdomen Pelvis w Contrast    Narrative    EXAM: CT ABDOMEN PELVIS W CONTRAST  LOCATION: [facility]  DATE: 6/27/2023    INDICATION: abd pain  COMPARISON: Ultrasound from 09/25/2021.  TECHNIQUE: CT scan of the abdomen and pelvis was performed following injection of IV contrast. Multiplanar reformats were obtained. Dose reduction techniques were used.  CONTRAST: 100mL Isovue 370    FINDINGS:   LOWER CHEST: Normal.    HEPATOBILIARY: Mild, diffuse hepatic steatosis. Normal gallbladder.    PANCREAS: Diffuse peripancreatic edema. No ductal dilatation or loculated peripancreatic collections.    SPLEEN: Normal.    ADRENAL GLANDS: Normal.    KIDNEYS/BLADDER: Normal.    BOWEL: Normal appendix. No bowel obstruction or free air.    LYMPH NODES: Normal.    VASCULATURE: Unremarkable.    PELVIC ORGANS: Normal.    MUSCULOSKELETAL: Normal.      Impression    IMPRESSION:   1.  Acute pancreatitis.    2.  Mild hepatic steatosis.   US Abdomen Limited    Narrative    US ABDOMEN LIMITED 6/27/2023 2:09 PM    CLINICAL HISTORY: pancreatitis  TECHNIQUE: Limited abdominal ultrasound.    COMPARISON: Same date CT    FINDINGS:    GALLBLADDER: The gallbladder is normal. No gallstones, wall  thickening, or pericholecystic fluid. Negative sonographic Cohen's  sign.    BILE DUCTS: There is no intrahepatic biliary dilatation. The common  duct measures 3 mm.    LIVER: Diffusely increased echogenicity with decreased conspicuity of  the portal triads.    RIGHT KIDNEY: No hydronephrosis.    PANCREAS: The pancreas is largely obscured by overlying gas.    No ascites.      Impression    IMPRESSION:  1.  No evidence of cholelithiasis or biliary obstruction.  2.  Hepatic steatosis.    LAURA CESPEDES MD         SYSTEM ID:  [id number]       Discharge Medications   Current Discharge Medication List      START taking  these medications    Details   Alcohol Swabs PADS Use to swab the area of the injection or rambo as directed Per insurance coverage  Qty: 100 each, Refills: 0    Associated Diagnoses: Diabetes mellitus, new onset (H)      blood glucose (NO BRAND SPECIFIED) lancets standard To use to test glucose level in the blood Use to test blood sugar  3 times daily as directed. To accompany glucose monitor brands per insurance coverage.  Qty: 100 each, Refills: 0    Associated Diagnoses: Diabetes mellitus, new onset (H)      blood glucose (NO BRAND SPECIFIED) test strip To use to test glucose level in the blood Use to test blood sugar  3 times daily as directed. To accompany glucose monitor brands per insurance coverage.  Qty: 100 strip, Refills: 0    Associated Diagnoses: Diabetes mellitus, new onset (H)      blood glucose calibration (NO BRAND SPECIFIED) solution Used to calibrate the blood glucose monitor as needed and as directed.  To accompany  blood glucose brands per insurance coverage  Qty: 1 each, Refills: 0    Associated Diagnoses: Diabetes mellitus, new onset (H)      blood glucose monitoring (NO BRAND SPECIFIED) meter device kit Check blood sugar 3x daily  Qty: 1 kit, Refills: 0    Associated Diagnoses: Diabetes mellitus, new onset (H)      insulin glargine (LANTUS PEN) 100 UNIT/ML pen Inject 8 Units Subcutaneous every morning (before breakfast)  Qty: 15 mL, Refills: 0    Comments: If Lantus is not covered by insurance, may substitute Basaglar or Semglee or other insulin glargine product per insurance preference at same dose and frequency.    Associated Diagnoses: Diabetes mellitus, new onset (H)      insulin lispro (HUMALOG VIAL) 100 UNIT/ML vial For Pre-Meal  - 189 give 1 unit. For Pre-Meal  - 239 give 2 units. For Pre-Meal  - 289 give 3 units. For Pre-Meal  - 339 give 4 units. For Pre-Meal -399 give 5 units. For Pre-Meal -449 give 6 units. For Pre-Meal BG = or > 450 give 7  units.  Qty: 10 mL, Refills: 0    Associated Diagnoses: Diabetes mellitus, new onset (H)      insulin pen needle (32G X 4 MM) 32G X 4 MM miscellaneous Use as directed by provider Per insurance coverage  Qty: 100 each, Refills: 0    Associated Diagnoses: Diabetes mellitus, new onset (H)      ondansetron (ZOFRAN ODT) 4 MG ODT tab Take 1 tablet (4 mg) by mouth every 6 hours as needed for nausea or vomiting  Qty: 15 tablet, Refills: 0    Associated Diagnoses: Acute pancreatitis, unspecified complication status, unspecified pancreatitis type           Allergies   No Known Allergies

## 2023-06-30 NOTE — PLAN OF CARE
Care from [id number]     Inpatient Progress Note:  For complete assessment see flow sheet documentation.     Orientation: alert and oriented (Armenian speaking)  Pain status: PRN oxycodone for pain.  Activity: SBA  Resp: WNL  Cardiac: WNL  GI: Discomfort in the abdominal area.  :  WNL  LDA: PIV  Infusions:  NS @100ml/hr  Diet: Full liquid      /70 (BP Location: Right arm)   Pulse 76   Temp 98.2  F (36.8  C) (Oral)   Resp 16   Wt 95.2 kg (209 lb 12.8 oz)   SpO2 93%   BMI 38.12 kg/m         Ongoing monitoring.      Jasen Tamez RN

## 2023-06-30 NOTE — PROGRESS NOTES
Spoke with patient utilizing professional iPad   Still having intermittent abdominal pain.  She still not comfortable about planned hospital discharge today.  Discussed with her that we should utilize more of oral pain regimen.  Advance diet to low-fat diet today.  Postpone planned discharge.  Continue to monitor.  Utilize oral pain medications rather than IV regimen.

## 2023-06-30 NOTE — PLAN OF CARE
Goal Outcome Evaluation:      Plan of Care Reviewed With: patient    Overall Patient Progress: improvingOverall Patient Progress: improving    Blood pressure 109/57, pulse 64, temperature 97.4  F (36.3  C), temperature source Oral, resp. rate 16, weight 95.2 kg (209 lb 12.8 oz), SpO2 96 %.    VSS other than patient continues to have severe abdominal pain rated at an 8 out of 10.  Pt is A&Ox4 on RA. 10 mg of Oxycodone prn provided for pain management.  Pt able to rest after receiving medication. alert and oriented x 4 and  services used for communication.  Pt received education on how to use glucometer to check BS and demonstrated correct technique.  Possible discharge on 07/01 if pain is controlled.  Pt tolerating low fat diet.  PIV in left arm SL. , 105, and 108. Continue with POC.

## 2023-07-01 VITALS
RESPIRATION RATE: 18 BRPM | HEART RATE: 76 BPM | OXYGEN SATURATION: 96 % | WEIGHT: 209.8 LBS | BODY MASS INDEX: 38.12 KG/M2 | DIASTOLIC BLOOD PRESSURE: 77 MMHG | TEMPERATURE: 98.3 F | SYSTOLIC BLOOD PRESSURE: 115 MMHG

## 2023-07-01 LAB
GLUCOSE BLDC GLUCOMTR-MCNC: 100 MG/DL (ref 70–99)
GLUCOSE BLDC GLUCOMTR-MCNC: 110 MG/DL (ref 70–99)
GLUCOSE BLDC GLUCOMTR-MCNC: 118 MG/DL (ref 70–99)

## 2023-07-01 PROCEDURE — 99232 SBSQ HOSP IP/OBS MODERATE 35: CPT | Performed by: INTERNAL MEDICINE

## 2023-07-01 RX ORDER — ACETAMINOPHEN 325 MG/1
650 TABLET ORAL EVERY 8 HOURS PRN
Qty: 60 TABLET | Refills: 0 | Status: SHIPPED | OUTPATIENT
Start: 2023-07-01

## 2023-07-01 RX ORDER — FAMOTIDINE 20 MG/1
20 TABLET, FILM COATED ORAL 2 TIMES DAILY
Qty: 15 TABLET | Refills: 0 | Status: SHIPPED | OUTPATIENT
Start: 2023-07-01

## 2023-07-01 ASSESSMENT — ACTIVITIES OF DAILY LIVING (ADL)
ADLS_ACUITY_SCORE: 20

## 2023-07-01 NOTE — PLAN OF CARE
A&Ox4. Up SBA when OOB. Patient is Sudanese speaking and requires an . On RA. On a low fat/carb count diet. PIV in L arm is saline locked. Continues to have abdominal pain rating an 8/10. Oxycodone given x1 with minimal relief per patient reports. Offered medication this AM for reports of pain 7/10 but patient refused. Patient appears to be able to rest after receiving medication.

## 2023-07-01 NOTE — PROGRESS NOTES
[facility]    Medicine Progress Note - Hospitalist Service    Date of Admission:  6/27/2023    Assessment & Plan    Chelsie Pierce is a 27 year old Beninese speaking female without PMH admitted on 6/27/2023 with n/v and abdominal pain and found to have pancreatitis/gastritis and a new diagnosis of T2dm      She woke up yesterday morning with epigastric pain.  Her pain worsened with eating but she was able to take in fluids ok.  She had associated n/v to the point that she couldn't keep anything down. She's had a few chills but no fevers or sweats.      She occasionally drinks alcohol.  She's apparently had a case of pancreatitis in the past but the details are unknown     ER evaluation VS wnl, temp is slightly up at 99.1, respiratory status is wnl   CMP notable for elevate AG 16 with CO2 24. LA is 1.5, ketones undetectable   LFTs diffusely elevated alk phos 124, AST/ALT 63/119, bili is wnl.   Lipase 930    CBC wnl  Pregnancy testing is negative      UA positive for nitrites trace LE 3 rbcs few bacteria and 6 SECs so the specimen is contaminated   CT a/p ordered      Problem List:   Acute pancreatitis  -Possibly passed stone?  Triglycerides okay, no heavy alcohol use  -CT abdomen pelvis with pancreatitis hepatic steatosis no biliary ductal dilation and ultrasound no cholelithiasis  --Tolerated advancement of diet.  Significantly improved with pain control.  Hopeful for home discharge     Diabetes mellitus type 2  -hemoglobin A1c at 10.  Patient lacks insurance we will consult pharmacy for insulin coverage  -Social work consulted  -Patient presented here with uncontrolled diabetes mellitus likely will benefit for initial insulin management that can be continued upon discharge.  -Discussed with her importance of close follow-up care and also touch base with patient's spouse over the phone  -Will need diabetes education.  Close follow-up care, will need to learn how to check her  blood sugar and administering insulin dosing.  -Potentially can be started on metformin as outpatient and trial of oral diabetic regimen in the next coming weeks upon follow-up.  -She is confident about checking her blood sugar.  Still has some questions regarding her insulin regimen.  Reinforced to her importance of follow-up care as well regarding her newly diagnosed diabetes mellitus.     Abnormal UA  Most consistent with contamination, no empiric abx     Elevated LFTs   CT abdomen shows hepatic steatosis  Improved monitor     Plans for hospital discharge today.  Please see my discharge summary last June 30, 2023 for other details.  Earlier planned discharge was canceled due to concerns of abdominal pain.  Overnight no further issues of uncontrolled abdominal pain.       Diet: Low Fat Diet  Diet    DVT Prophylaxis: Pneumatic Compression Devices  Canales Catheter: Not present  Lines: None     Cardiac Monitoring: None  Code Status: Full Code      Clinically Significant Risk Factors                        # DMII: A1C = 10.3 % (Ref range: <5.7 %) within past 6 months                      Castro Baron MD, MD  Hospitalist Service  [facility]  Securely message with [employer] (more info)  Text page via [employer] Paging/Directory   ______________________________________________________________________    Interval History     Continuing medicine service care today.  Seen and examined  I utilized a bedside iPad .  She appears to be in good spirits.  She mentioned that she is feeling a whole lot better.  Able to tolerate advancement of her diet with no complaints of nausea, vomiting or any worsening abdominal pain.  She is not requiring any further opioid medication this morning.  She is hopeful and looking forward in going home today.  She has some questions regarding dietary modifications and hoping to further discuss with dietary services if available.        Physical Exam   Vital  Signs: Temp: 98.3  F (36.8  C) Temp src: Oral BP: 115/77 Pulse: 76   Resp: 18 SpO2: 96 % O2 Device: None (Room air)    Weight: 209 lbs 12.8 oz    Obese  HEENT; Atraumatic, normocephalic, pinkish conjuctiva, pupils bilateral reactive   Skin: warm and moist, no rashes  Lymphatics: no cervical or axillary lymphandenopathy  Lungs: equal chest expansion, clear to auscultation, no wheezes, no stridor, no crackles,   Heart: normal rate, normal rhythm, no rubs or gallops.   Abdomen:  minimal discomfort upon palpation at the epigastric area, no guarding, positive bowel sounds  Extremities: no deformities, no edema   Neuro; follow commands, alert and oriented x3, spontaneous speech, coherent, moves all extremities spontaneously  Psych; no hallucination, euthymic mood, not agitated        Medical Decision Making       45 MINUTES SPENT BY ME on the date of service doing chart review, history, exam, documentation & further activities per the note.  MANAGEMENT DISCUSSED with the following over the past 24 hours: yes   NOTE(S)/MEDICAL RECORDS REVIEWED over the past 24 hours: yes  Tests ORDERED & REVIEWED in the past 24 hours:      Data         Imaging results reviewed over the past 24 hrs:   No results found for this or any previous visit (from the past 24 hour(s)).

## 2023-07-01 NOTE — CARE PLAN
Blood pressure 115/77, pulse 76, temperature 98.3  F (36.8  C), temperature source Oral, resp. rate 18, weight 95.2 kg (209 lb 12.8 oz), SpO2 96 %.      VSS and patient is stating that the pain is tolerable.  Pt declined taking pain medication for pain rated a 6.  PIV removed.  Pt educated on insulin, glucometer, and insulin pen use.  Pt states understanding.  Pt is aware to follow up with Sri from   department.  Discharge paperwork reviewed.  Discussed importance of follow up in 7 days with a primary care provider.  Medications reviewed and provided.  Discussed hypoglycemia and provided patient information on family clinic with sliding scale fees.  Pt ambulated off unit with partner.

## 2025-05-09 ENCOUNTER — HOSPITAL ENCOUNTER (EMERGENCY)
Facility: CLINIC | Age: 30
Discharge: HOME | End: 2025-05-10
Attending: EMERGENCY MEDICINE | Admitting: EMERGENCY MEDICINE
Payer: COMMERCIAL

## 2025-05-09 VITALS
TEMPERATURE: 97.9 F | DIASTOLIC BLOOD PRESSURE: 78 MMHG | WEIGHT: 212.74 LBS | BODY MASS INDEX: 38.66 KG/M2 | OXYGEN SATURATION: 99 % | RESPIRATION RATE: 20 BRPM | HEART RATE: 73 BPM | SYSTOLIC BLOOD PRESSURE: 120 MMHG

## 2025-05-09 DIAGNOSIS — R42 VERTIGO: ICD-10-CM

## 2025-05-09 LAB
ANION GAP SERPL CALCULATED.3IONS-SCNC: 9 MMOL/L (ref 7–15)
BASOPHILS # BLD AUTO: 0 10E3/UL (ref 0–0.2)
BASOPHILS NFR BLD AUTO: 0 %
BUN SERPL-MCNC: 10.5 MG/DL (ref 6–20)
CALCIUM SERPL-MCNC: 9.8 MG/DL (ref 8.8–10.4)
CHLORIDE SERPL-SCNC: 103 MMOL/L (ref 98–107)
CREAT SERPL-MCNC: 0.48 MG/DL (ref 0.51–0.95)
EGFRCR SERPLBLD CKD-EPI 2021: >90 ML/MIN/1.73M2
EOSINOPHIL # BLD AUTO: 0.4 10E3/UL (ref 0–0.7)
EOSINOPHIL NFR BLD AUTO: 6 %
ERYTHROCYTE [DISTWIDTH] IN BLOOD BY AUTOMATED COUNT: 13.1 % (ref 10–15)
GLUCOSE BLDC GLUCOMTR-MCNC: 132 MG/DL (ref 70–99)
GLUCOSE SERPL-MCNC: 119 MG/DL (ref 70–99)
HCO3 SERPL-SCNC: 26 MMOL/L (ref 22–29)
HCT VFR BLD AUTO: 38 % (ref 35–47)
HGB BLD-MCNC: 12.5 G/DL (ref 11.7–15.7)
HOLD SPECIMEN: NORMAL
IMM GRANULOCYTES # BLD: 0 10E3/UL
IMM GRANULOCYTES NFR BLD: 0 %
LYMPHOCYTES # BLD AUTO: 1.8 10E3/UL (ref 0.8–5.3)
LYMPHOCYTES NFR BLD AUTO: 25 %
MCH RBC QN AUTO: 29.3 PG (ref 26.5–33)
MCHC RBC AUTO-ENTMCNC: 32.9 G/DL (ref 31.5–36.5)
MCV RBC AUTO: 89 FL (ref 78–100)
MONOCYTES # BLD AUTO: 0.4 10E3/UL (ref 0–1.3)
MONOCYTES NFR BLD AUTO: 6 %
NEUTROPHILS # BLD AUTO: 4.6 10E3/UL (ref 1.6–8.3)
NEUTROPHILS NFR BLD AUTO: 63 %
NRBC # BLD AUTO: 0 10E3/UL
NRBC BLD AUTO-RTO: 0 /100
PLATELET # BLD AUTO: 291 10E3/UL (ref 150–450)
POTASSIUM SERPL-SCNC: 4.1 MMOL/L (ref 3.4–5.3)
RBC # BLD AUTO: 4.27 10E6/UL (ref 3.8–5.2)
SODIUM SERPL-SCNC: 138 MMOL/L (ref 135–145)
WBC # BLD AUTO: 7.4 10E3/UL (ref 4–11)

## 2025-05-09 PROCEDURE — 93005 ELECTROCARDIOGRAM TRACING: CPT

## 2025-05-09 PROCEDURE — 82310 ASSAY OF CALCIUM: CPT | Performed by: EMERGENCY MEDICINE

## 2025-05-09 PROCEDURE — 250N000013 HC RX MED GY IP 250 OP 250 PS 637: Performed by: EMERGENCY MEDICINE

## 2025-05-09 PROCEDURE — 36415 COLL VENOUS BLD VENIPUNCTURE: CPT | Performed by: EMERGENCY MEDICINE

## 2025-05-09 PROCEDURE — 99284 EMERGENCY DEPT VISIT MOD MDM: CPT

## 2025-05-09 PROCEDURE — 250N000011 HC RX IP 250 OP 636: Performed by: EMERGENCY MEDICINE

## 2025-05-09 PROCEDURE — 82962 GLUCOSE BLOOD TEST: CPT

## 2025-05-09 PROCEDURE — 85025 COMPLETE CBC W/AUTO DIFF WBC: CPT | Performed by: EMERGENCY MEDICINE

## 2025-05-09 RX ORDER — ONDANSETRON 4 MG/1
4 TABLET, ORALLY DISINTEGRATING ORAL ONCE
Status: COMPLETED | OUTPATIENT
Start: 2025-05-09 | End: 2025-05-09

## 2025-05-09 RX ORDER — MECLIZINE HYDROCHLORIDE 25 MG/1
25 TABLET ORAL EVERY 6 HOURS PRN
Qty: 30 TABLET | Refills: 1 | Status: SHIPPED | OUTPATIENT
Start: 2025-05-09

## 2025-05-09 RX ORDER — MECLIZINE HYDROCHLORIDE 25 MG/1
25 TABLET ORAL ONCE
Status: COMPLETED | OUTPATIENT
Start: 2025-05-09 | End: 2025-05-09

## 2025-05-09 RX ADMIN — ONDANSETRON 4 MG: 4 TABLET, ORALLY DISINTEGRATING ORAL at 21:45

## 2025-05-09 RX ADMIN — MECLIZINE HYDROCHLORIDE 25 MG: 25 TABLET ORAL at 21:45

## 2025-05-09 ASSESSMENT — COLUMBIA-SUICIDE SEVERITY RATING SCALE - C-SSRS
2. HAVE YOU ACTUALLY HAD ANY THOUGHTS OF KILLING YOURSELF IN THE PAST MONTH?: NO
1. IN THE PAST MONTH, HAVE YOU WISHED YOU WERE DEAD OR WISHED YOU COULD GO TO SLEEP AND NOT WAKE UP?: NO
6. HAVE YOU EVER DONE ANYTHING, STARTED TO DO ANYTHING, OR PREPARED TO DO ANYTHING TO END YOUR LIFE?: NO

## 2025-05-09 ASSESSMENT — ACTIVITIES OF DAILY LIVING (ADL): ADLS_ACUITY_SCORE: 56

## 2025-05-10 NOTE — ED PROVIDER NOTES
Emergency Department Note      History of Present Illness     Chief Complaint   Dizziness    HPI History obtained using a professional    Chelsie Pierce is a 29 year old female with history of type 2 diabetes who presents to the ED with her  for evaluation of dizziness. The patient reports that at 1300 today while watching TV, she became dizzy and nauseous. She describes her dizziness as room spinning. Her symptoms worsen when she moves her head side to side. Patient also had an episode of emesis at 1700. This has never happened before. Denies headache, ringing in her ears, hearing changes, or recent head injury.  Numbness, tingling or weakness in any 1 extremity or the other.  No speech difficulty.  Denies chance of pregnancy. Denies recent medication changes. Patient does have type 2 diabetes but her sugars have been well controlled. Mentions she has bariatric surgery scheduled for the 28th.     Independent Historian   None    Review of External Notes   None    Past Medical History     Medical History and Problem List   Acute pancreatitis  Emmetropia   Gastritis  GERD  Hepatic steatosis   Hyperglycemia   Iron deficiency anemia  Metabolic dysfunction-associated steatotic liver disease  Obesity    Type 2 diabetes    Medications   Colace  Glucophage  Humalog  Lantus Pen  Pepcid  Roxicodone   Zofran    Surgical History   Tubal ligation    Physical Exam     Patient Vitals for the past 24 hrs:   BP Temp Temp src Pulse Resp SpO2 Weight   05/09/25 2139 120/78 97.9  F (36.6  C) Temporal 73 20 99 % 96.5 kg (212 lb 11.9 oz)     Physical Exam  General: Adult sitting upright  Eyes: PERRL, Conjunctive within normal limits.  Horizontal nystagmus appreciated but EOMI testing EOMI.  No diplopia..  ENT: Moist mucous membranes, oropharynx clear.   CV: Normal S1S2, no murmur, rub or gallop. Regular rate and rhythm  Resp: Clear to auscultation bilaterally, no wheezes, rales or rhonchi. Normal  respiratory effort.  GI: Abdomen is soft, nontender and nondistended. No palpable masses. No rebound or guarding.  MSK: No edema. Nontender. Normal active range of motion.  Skin: Warm and dry. No rashes or lesions or ecchymoses on visible skin.  Neuro: Alert and oriented. Responds appropriately to all questions and commands. No focal findings appreciated. Normal muscle tone.  Psych: Normal mood and affect. Pleasant.     Diagnostics     Lab Results   Labs Ordered and Resulted from Time of ED Arrival to Time of ED Departure   BASIC METABOLIC PANEL - Abnormal       Result Value    Sodium 138      Potassium 4.1      Chloride 103      Carbon Dioxide (CO2) 26      Anion Gap 9      Urea Nitrogen 10.5      Creatinine 0.48 (*)     GFR Estimate >90      Calcium 9.8      Glucose 119 (*)    GLUCOSE BY METER - Abnormal    GLUCOSE BY METER POCT 132 (*)    CBC WITH PLATELETS AND DIFFERENTIAL    WBC Count 7.4      RBC Count 4.27      Hemoglobin 12.5      Hematocrit 38.0      MCV 89      MCH 29.3      MCHC 32.9      RDW 13.1      Platelet Count 291      % Neutrophils 63      % Lymphocytes 25      % Monocytes 6      % Eosinophils 6      % Basophils 0      % Immature Granulocytes 0      NRBCs per 100 WBC 0      Absolute Neutrophils 4.6      Absolute Lymphocytes 1.8      Absolute Monocytes 0.4      Absolute Eosinophils 0.4      Absolute Basophils 0.0      Absolute Immature Granulocytes 0.0      Absolute NRBCs 0.0       Imaging   None     EKG   ECG taken at 2153, ECG read at 2159  Sinus bradycardia with sinus arrhythmia   Rate 54 bpm. CA interval 166 ms. QRS duration 88 ms. QT/QTc 430/407 ms. P-R-T axes 21 19 19.    Independent Interpretation   None    ED Course      Medications Administered   Medications   meclizine (ANTIVERT) tablet 25 mg (25 mg Oral $Given 5/9/25 2145)   ondansetron (ZOFRAN ODT) ODT tab 4 mg (4 mg Oral $Given 5/9/25 2145)     Procedures   None      Discussion of Management   None    ED Course   ED Course as of  05/09/25 2333   Fri May 09, 2025   1290 I obtained history and examined the patient as noted above.    I discussed findings with the patient.  The patient notes she is feeling improved after meclizine and would like to go home.  She is ambulatory with a steady gait.    Additional Documentation  None    Medical Decision Making / Diagnosis     CMS Diagnoses: None    MIPS            None    [facility]   Chelsie Pierce is a 29 year old female who presents for evaluation of vertigo. The differential diagnosis of vertigo is broad and includes common etiologies such as menieres disease, labyrinthitis, benign positional vertigo, otitis media, etc.  More serious etiologies considered include central etiologies such as tumor, intracerebral bleed, dissection, ischemic cerebral vascular accident.  The history, physical exam including detailed neurologic exam, and workup in the emergency room suggests a benign cause of vertigo today.  Patient feels improved after interventions noted above. Further outpatient management is indicated with vertigo medications.       No indication for advanced imaging at this point (CT/MRI) as there are no definite signs of a central and concerning etiology for the vertigo.      Vertigo precautions given for home.   She will return if her symptoms worsen or new symptoms develop specifically any neurologic compromise which was discussed in detail with her.    Disposition   The patient was discharged.     Diagnosis     ICD-10-CM    1. Vertigo  R42          Discharge Medications   Current Discharge Medication List        START taking these medications    Details   meclizine (ANTIVERT) 25 MG tablet Take 1 tablet (25 mg) by mouth every 6 hours as needed for dizziness.  Qty: 30 tablet, Refills: 1           Scribe Disclosure:  NORBERTO STEVENSON, am serving as a scribe at 11:25 PM on 5/9/2025 to document services personally performed by Jeny Crook MD based on my observations and the  provider's statements to me.      Jeny Crook MD  05/10/25 5012

## 2025-05-10 NOTE — ED TRIAGE NOTES
Pt arrives from home and states that since about 1300 today she began having dizziness and nausea she threw up about 1700.  Pt denies pain no neurological deficits noted BEFAST negative.      Triage Assessment (Adult)       Row Name 05/09/25 5262          Triage Assessment    Airway WDL WDL        Respiratory WDL    Respiratory WDL WDL        Skin Circulation/Temperature WDL    Skin Circulation/Temperature WDL WDL        Cardiac WDL    Cardiac WDL WDL        Peripheral/Neurovascular WDL    Peripheral Neurovascular WDL WDL        Cognitive/Neuro/Behavioral WDL    Cognitive/Neuro/Behavioral WDL WDL

## 2025-05-12 LAB
ATRIAL RATE - MUSE: 54 BPM
DIASTOLIC BLOOD PRESSURE - MUSE: NORMAL MMHG
INTERPRETATION ECG - MUSE: NORMAL
P AXIS - MUSE: 21 DEGREES
PR INTERVAL - MUSE: 166 MS
QRS DURATION - MUSE: 88 MS
QT - MUSE: 430 MS
QTC - MUSE: 407 MS
R AXIS - MUSE: 19 DEGREES
SYSTOLIC BLOOD PRESSURE - MUSE: NORMAL MMHG
T AXIS - MUSE: 19 DEGREES
VENTRICULAR RATE- MUSE: 54 BPM